# Patient Record
Sex: FEMALE | Race: WHITE | NOT HISPANIC OR LATINO | Employment: UNEMPLOYED | URBAN - METROPOLITAN AREA
[De-identification: names, ages, dates, MRNs, and addresses within clinical notes are randomized per-mention and may not be internally consistent; named-entity substitution may affect disease eponyms.]

---

## 2023-08-30 ENCOUNTER — APPOINTMENT (OUTPATIENT)
Dept: RADIOLOGY | Facility: MEDICAL CENTER | Age: 53
DRG: 085 | End: 2023-08-30
Attending: SURGERY
Payer: MEDICAID

## 2023-08-30 ENCOUNTER — HOSPITAL ENCOUNTER (EMERGENCY)
Facility: MEDICAL CENTER | Age: 53
End: 2023-09-03

## 2023-08-30 ENCOUNTER — APPOINTMENT (OUTPATIENT)
Dept: RADIOLOGY | Facility: MEDICAL CENTER | Age: 53
DRG: 085 | End: 2023-08-30
Attending: STUDENT IN AN ORGANIZED HEALTH CARE EDUCATION/TRAINING PROGRAM
Payer: MEDICAID

## 2023-08-30 ENCOUNTER — HOSPITAL ENCOUNTER (INPATIENT)
Facility: MEDICAL CENTER | Age: 53
LOS: 3 days | DRG: 085 | End: 2023-09-02
Attending: EMERGENCY MEDICINE | Admitting: SURGERY
Payer: MEDICAID

## 2023-08-30 ENCOUNTER — APPOINTMENT (OUTPATIENT)
Dept: RADIOLOGY | Facility: MEDICAL CENTER | Age: 53
DRG: 085 | End: 2023-08-30
Attending: EMERGENCY MEDICINE
Payer: MEDICAID

## 2023-08-30 DIAGNOSIS — J98.8 AIRWAY COMPROMISE: ICD-10-CM

## 2023-08-30 DIAGNOSIS — F10.920 ACUTE ALCOHOLIC INTOXICATION WITHOUT COMPLICATION (HCC): ICD-10-CM

## 2023-08-30 DIAGNOSIS — T14.90XA TRAUMA: ICD-10-CM

## 2023-08-30 DIAGNOSIS — S06.6X9A SUBARACHNOID HEMORRHAGE FOLLOWING INJURY, WITH LOSS OF CONSCIOUSNESS, INITIAL ENCOUNTER (HCC): ICD-10-CM

## 2023-08-30 DIAGNOSIS — R41.82 ALTERED MENTAL STATUS, UNSPECIFIED ALTERED MENTAL STATUS TYPE: ICD-10-CM

## 2023-08-30 PROBLEM — Z86.79 HISTORY OF ATRIAL FIBRILLATION: Status: ACTIVE | Noted: 2023-08-30

## 2023-08-30 PROBLEM — J96.90 RESPIRATORY FAILURE FOLLOWING TRAUMA (HCC): Status: ACTIVE | Noted: 2023-08-30

## 2023-08-30 PROBLEM — F10.929 ACUTE ALCOHOL INTOXICATION (HCC): Status: ACTIVE | Noted: 2023-08-30

## 2023-08-30 PROBLEM — S06.6X1A TRAUMATIC SUBARACHNOID HEMORRHAGE WITH LOSS OF CONSCIOUSNESS OF 30 MINUTES OR LESS (HCC): Status: ACTIVE | Noted: 2023-08-30

## 2023-08-30 PROBLEM — Z53.09 CONTRAINDICATION TO DEEP VEIN THROMBOSIS (DVT) PROPHYLAXIS: Status: ACTIVE | Noted: 2023-08-30

## 2023-08-30 LAB
ABO + RH BLD: NORMAL
ABO GROUP BLD: NORMAL
ALBUMIN SERPL BCP-MCNC: 4.2 G/DL (ref 3.2–4.9)
ALBUMIN/GLOB SERPL: 2.1 G/DL
ALP SERPL-CCNC: 42 U/L (ref 30–99)
ALT SERPL-CCNC: 26 U/L (ref 2–50)
ANION GAP SERPL CALC-SCNC: 16 MMOL/L (ref 7–16)
APTT PPP: 25.5 SEC (ref 24.7–36)
AST SERPL-CCNC: 32 U/L (ref 12–45)
BASE EXCESS BLDA CALC-SCNC: -6 MMOL/L (ref -4–3)
BILIRUB SERPL-MCNC: 0.4 MG/DL (ref 0.1–1.5)
BLD GP AB SCN SERPL QL: NORMAL
BODY TEMPERATURE: 35.6 CENTIGRADE
BUN SERPL-MCNC: 6 MG/DL (ref 8–22)
CALCIUM ALBUM COR SERPL-MCNC: 8.3 MG/DL (ref 8.5–10.5)
CALCIUM SERPL-MCNC: 8.5 MG/DL (ref 8.5–10.5)
CFT BLD TEG: 1.9 MIN (ref 4.6–9.1)
CFT P HPASE BLD TEG: 1.8 MIN (ref 4.3–8.3)
CHLORIDE SERPL-SCNC: 105 MMOL/L (ref 96–112)
CLOT ANGLE BLD TEG: 70.8 DEGREES (ref 63–78)
CLOT LYSIS 30M P MA LENFR BLD TEG: 1.3 % (ref 0–2.6)
CO2 SERPL-SCNC: 20 MMOL/L (ref 20–33)
CREAT SERPL-MCNC: 0.63 MG/DL (ref 0.5–1.4)
CT.EXTRINSIC BLD ROTEM: 1.8 MIN (ref 0.8–2.1)
ERYTHROCYTE [DISTWIDTH] IN BLOOD BY AUTOMATED COUNT: 42 FL (ref 35.9–50)
ETHANOL BLD-MCNC: 233.9 MG/DL
GFR SERPLBLD CREATININE-BSD FMLA CKD-EPI: 107 ML/MIN/1.73 M 2
GLOBULIN SER CALC-MCNC: 2 G/DL (ref 1.9–3.5)
GLUCOSE BLD STRIP.AUTO-MCNC: 92 MG/DL (ref 65–99)
GLUCOSE SERPL-MCNC: 103 MG/DL (ref 65–99)
HCG SERPL QL: NEGATIVE
HCO3 BLDA-SCNC: 18 MMOL/L (ref 17–25)
HCT VFR BLD AUTO: 39.6 % (ref 37–47)
HGB BLD-MCNC: 13.1 G/DL (ref 12–16)
INHALED O2 FLOW RATE: ABNORMAL L/MIN
INR PPP: 0.96 (ref 0.87–1.13)
LACTATE SERPL-SCNC: 4.3 MMOL/L (ref 0.5–2)
MCF BLD TEG: 51.1 MM (ref 52–69)
MCF.PLATELET INHIB BLD ROTEM: 15.4 MM (ref 15–32)
MCH RBC QN AUTO: 31.6 PG (ref 27–33)
MCHC RBC AUTO-ENTMCNC: 33.1 G/DL (ref 32.2–35.5)
MCV RBC AUTO: 95.7 FL (ref 81.4–97.8)
PA AA BLD-ACNC: 2.2 % (ref 0–11)
PA ADP BLD-ACNC: 5.6 % (ref 0–17)
PCO2 BLDA: 32.7 MMHG (ref 26–37)
PCO2 TEMP ADJ BLDA: 30.8 MMHG (ref 26–37)
PH BLDA: 7.37 [PH] (ref 7.4–7.5)
PH TEMP ADJ BLDA: 7.39 [PH] (ref 7.4–7.5)
PLATELET # BLD AUTO: 200 K/UL (ref 164–446)
PMV BLD AUTO: 10.9 FL (ref 9–12.9)
PO2 BLDA: 440.3 MMHG (ref 64–87)
PO2 TEMP ADJ BLDA: 431.9 MMHG (ref 64–87)
POTASSIUM SERPL-SCNC: 3.4 MMOL/L (ref 3.6–5.5)
PROT SERPL-MCNC: 6.2 G/DL (ref 6–8.2)
PROTHROMBIN TIME: 12.9 SEC (ref 12–14.6)
RBC # BLD AUTO: 4.14 M/UL (ref 4.2–5.4)
RH BLD: NORMAL
SAO2 % BLDA: 99 % (ref 93–99)
SODIUM SERPL-SCNC: 141 MMOL/L (ref 135–145)
TEG ALGORITHM TGALG: ABNORMAL
WBC # BLD AUTO: 2.8 K/UL (ref 4.8–10.8)

## 2023-08-30 PROCEDURE — 700105 HCHG RX REV CODE 258: Performed by: SURGERY

## 2023-08-30 PROCEDURE — 99291 CRITICAL CARE FIRST HOUR: CPT | Performed by: SURGERY

## 2023-08-30 PROCEDURE — 99291 CRITICAL CARE FIRST HOUR: CPT

## 2023-08-30 PROCEDURE — 71045 X-RAY EXAM CHEST 1 VIEW: CPT

## 2023-08-30 PROCEDURE — 82077 ASSAY SPEC XCP UR&BREATH IA: CPT

## 2023-08-30 PROCEDURE — 94002 VENT MGMT INPAT INIT DAY: CPT

## 2023-08-30 PROCEDURE — 36415 COLL VENOUS BLD VENIPUNCTURE: CPT

## 2023-08-30 PROCEDURE — 83605 ASSAY OF LACTIC ACID: CPT

## 2023-08-30 PROCEDURE — 770022 HCHG ROOM/CARE - ICU (200)

## 2023-08-30 PROCEDURE — 700101 HCHG RX REV CODE 250: Performed by: SURGERY

## 2023-08-30 PROCEDURE — 82962 GLUCOSE BLOOD TEST: CPT

## 2023-08-30 PROCEDURE — 31500 INSERT EMERGENCY AIRWAY: CPT

## 2023-08-30 PROCEDURE — 80053 COMPREHEN METABOLIC PANEL: CPT

## 2023-08-30 PROCEDURE — 71260 CT THORAX DX C+: CPT

## 2023-08-30 PROCEDURE — 85576 BLOOD PLATELET AGGREGATION: CPT

## 2023-08-30 PROCEDURE — 86901 BLOOD TYPING SEROLOGIC RH(D): CPT

## 2023-08-30 PROCEDURE — 86900 BLOOD TYPING SEROLOGIC ABO: CPT

## 2023-08-30 PROCEDURE — 93005 ELECTROCARDIOGRAM TRACING: CPT | Performed by: SURGERY

## 2023-08-30 PROCEDURE — 85730 THROMBOPLASTIN TIME PARTIAL: CPT

## 2023-08-30 PROCEDURE — 700111 HCHG RX REV CODE 636 W/ 250 OVERRIDE (IP): Mod: JZ | Performed by: SURGERY

## 2023-08-30 PROCEDURE — 700111 HCHG RX REV CODE 636 W/ 250 OVERRIDE (IP): Performed by: INTERNAL MEDICINE

## 2023-08-30 PROCEDURE — G0390 TRAUMA RESPONS W/HOSP CRITI: HCPCS

## 2023-08-30 PROCEDURE — 85610 PROTHROMBIN TIME: CPT

## 2023-08-30 PROCEDURE — 82803 BLOOD GASES ANY COMBINATION: CPT

## 2023-08-30 PROCEDURE — 84703 CHORIONIC GONADOTROPIN ASSAY: CPT

## 2023-08-30 PROCEDURE — 304538 HCHG NG TUBE

## 2023-08-30 PROCEDURE — 85347 COAGULATION TIME ACTIVATED: CPT

## 2023-08-30 PROCEDURE — 72170 X-RAY EXAM OF PELVIS: CPT

## 2023-08-30 PROCEDURE — 85027 COMPLETE CBC AUTOMATED: CPT

## 2023-08-30 PROCEDURE — 80307 DRUG TEST PRSMV CHEM ANLYZR: CPT

## 2023-08-30 PROCEDURE — 700117 HCHG RX CONTRAST REV CODE 255: Performed by: EMERGENCY MEDICINE

## 2023-08-30 PROCEDURE — 72131 CT LUMBAR SPINE W/O DYE: CPT

## 2023-08-30 PROCEDURE — 94799 UNLISTED PULMONARY SVC/PX: CPT

## 2023-08-30 PROCEDURE — 72125 CT NECK SPINE W/O DYE: CPT

## 2023-08-30 PROCEDURE — 72128 CT CHEST SPINE W/O DYE: CPT

## 2023-08-30 PROCEDURE — 85384 FIBRINOGEN ACTIVITY: CPT

## 2023-08-30 PROCEDURE — 70450 CT HEAD/BRAIN W/O DYE: CPT

## 2023-08-30 PROCEDURE — 86850 RBC ANTIBODY SCREEN: CPT

## 2023-08-30 RX ORDER — SODIUM CHLORIDE 9 MG/ML
INJECTION, SOLUTION INTRAVENOUS CONTINUOUS
Status: DISCONTINUED | OUTPATIENT
Start: 2023-08-30 | End: 2023-09-02 | Stop reason: HOSPADM

## 2023-08-30 RX ORDER — ROCURONIUM BROMIDE 10 MG/ML
INJECTION, SOLUTION INTRAVENOUS
Status: COMPLETED | OUTPATIENT
Start: 2023-08-30 | End: 2023-08-30

## 2023-08-30 RX ORDER — POLYETHYLENE GLYCOL 3350 17 G/17G
1 POWDER, FOR SOLUTION ORAL 2 TIMES DAILY
Status: DISCONTINUED | OUTPATIENT
Start: 2023-08-30 | End: 2023-09-02 | Stop reason: HOSPADM

## 2023-08-30 RX ORDER — FAMOTIDINE 20 MG/1
20 TABLET, FILM COATED ORAL 2 TIMES DAILY
Status: DISCONTINUED | OUTPATIENT
Start: 2023-08-30 | End: 2023-08-31

## 2023-08-30 RX ORDER — AMOXICILLIN 250 MG
1 CAPSULE ORAL
Status: DISCONTINUED | OUTPATIENT
Start: 2023-08-30 | End: 2023-09-02 | Stop reason: HOSPADM

## 2023-08-30 RX ORDER — SODIUM CHLORIDE, SODIUM LACTATE, POTASSIUM CHLORIDE, AND CALCIUM CHLORIDE .6; .31; .03; .02 G/100ML; G/100ML; G/100ML; G/100ML
1000 INJECTION, SOLUTION INTRAVENOUS ONCE
Status: COMPLETED | OUTPATIENT
Start: 2023-08-30 | End: 2023-08-31

## 2023-08-30 RX ORDER — DOCUSATE SODIUM 100 MG/1
100 CAPSULE, LIQUID FILLED ORAL 2 TIMES DAILY
Status: DISCONTINUED | OUTPATIENT
Start: 2023-08-30 | End: 2023-09-02 | Stop reason: HOSPADM

## 2023-08-30 RX ORDER — BISACODYL 10 MG
10 SUPPOSITORY, RECTAL RECTAL
Status: DISCONTINUED | OUTPATIENT
Start: 2023-08-30 | End: 2023-09-02 | Stop reason: HOSPADM

## 2023-08-30 RX ORDER — ONDANSETRON 4 MG/1
4 TABLET, ORALLY DISINTEGRATING ORAL EVERY 4 HOURS PRN
Status: DISCONTINUED | OUTPATIENT
Start: 2023-08-30 | End: 2023-09-02 | Stop reason: HOSPADM

## 2023-08-30 RX ORDER — MIDAZOLAM HYDROCHLORIDE 1 MG/ML
INJECTION INTRAMUSCULAR; INTRAVENOUS
Status: COMPLETED | OUTPATIENT
Start: 2023-08-30 | End: 2023-08-30

## 2023-08-30 RX ORDER — LEVETIRACETAM 500 MG/5ML
500 INJECTION, SOLUTION, CONCENTRATE INTRAVENOUS EVERY 12 HOURS
Status: DISCONTINUED | OUTPATIENT
Start: 2023-08-30 | End: 2023-09-02 | Stop reason: HOSPADM

## 2023-08-30 RX ORDER — AMOXICILLIN 250 MG
1 CAPSULE ORAL NIGHTLY
Status: DISCONTINUED | OUTPATIENT
Start: 2023-08-30 | End: 2023-09-02 | Stop reason: HOSPADM

## 2023-08-30 RX ORDER — MIDAZOLAM HYDROCHLORIDE 1 MG/ML
4 INJECTION INTRAMUSCULAR; INTRAVENOUS ONCE
Status: COMPLETED | OUTPATIENT
Start: 2023-08-30 | End: 2023-08-30

## 2023-08-30 RX ORDER — ENEMA 19; 7 G/133ML; G/133ML
1 ENEMA RECTAL
Status: DISCONTINUED | OUTPATIENT
Start: 2023-08-30 | End: 2023-09-02 | Stop reason: HOSPADM

## 2023-08-30 RX ORDER — ONDANSETRON 2 MG/ML
4 INJECTION INTRAMUSCULAR; INTRAVENOUS EVERY 4 HOURS PRN
Status: DISCONTINUED | OUTPATIENT
Start: 2023-08-30 | End: 2023-09-02 | Stop reason: HOSPADM

## 2023-08-30 RX ORDER — OXYCODONE HYDROCHLORIDE 5 MG/1
5 TABLET ORAL
Status: DISCONTINUED | OUTPATIENT
Start: 2023-08-30 | End: 2023-09-02 | Stop reason: HOSPADM

## 2023-08-30 RX ADMIN — LEVETIRACETAM 500 MG: 100 INJECTION, SOLUTION, CONCENTRATE INTRAVENOUS at 23:36

## 2023-08-30 RX ADMIN — ROCURONIUM BROMIDE 50 MG: 10 INJECTION, SOLUTION INTRAVENOUS at 22:18

## 2023-08-30 RX ADMIN — IOHEXOL 80 ML: 350 INJECTION, SOLUTION INTRAVENOUS at 22:25

## 2023-08-30 RX ADMIN — SODIUM CHLORIDE, POTASSIUM CHLORIDE, SODIUM LACTATE AND CALCIUM CHLORIDE 1000 ML: 600; 310; 30; 20 INJECTION, SOLUTION INTRAVENOUS at 23:16

## 2023-08-30 RX ADMIN — FAMOTIDINE 20 MG: 10 INJECTION INTRAVENOUS at 22:52

## 2023-08-30 RX ADMIN — SODIUM CHLORIDE: 9 INJECTION, SOLUTION INTRAVENOUS at 22:51

## 2023-08-30 RX ADMIN — PROPOFOL 5 MCG/KG/MIN: 10 INJECTION, EMULSION INTRAVENOUS at 23:51

## 2023-08-30 RX ADMIN — MIDAZOLAM 4 MG: 1 INJECTION, SOLUTION INTRAMUSCULAR; INTRAVENOUS at 22:16

## 2023-08-30 ASSESSMENT — PATIENT HEALTH QUESTIONNAIRE - PHQ9
SUM OF ALL RESPONSES TO PHQ9 QUESTIONS 1 AND 2: 0
1. LITTLE INTEREST OR PLEASURE IN DOING THINGS: NOT AT ALL
2. FEELING DOWN, DEPRESSED, IRRITABLE, OR HOPELESS: NOT AT ALL

## 2023-08-30 ASSESSMENT — PAIN DESCRIPTION - PAIN TYPE: TYPE: ACUTE PAIN

## 2023-08-31 ENCOUNTER — APPOINTMENT (OUTPATIENT)
Dept: RADIOLOGY | Facility: MEDICAL CENTER | Age: 53
DRG: 085 | End: 2023-08-31
Attending: SURGERY
Payer: MEDICAID

## 2023-08-31 ENCOUNTER — APPOINTMENT (OUTPATIENT)
Dept: RADIOLOGY | Facility: MEDICAL CENTER | Age: 53
DRG: 085 | End: 2023-08-31
Attending: NURSE PRACTITIONER
Payer: MEDICAID

## 2023-08-31 ENCOUNTER — HOSPITAL ENCOUNTER (OUTPATIENT)
Dept: RADIOLOGY | Facility: MEDICAL CENTER | Age: 53
End: 2023-08-31
Attending: SURGERY
Payer: MEDICAID

## 2023-08-31 PROBLEM — Z02.9 DISCHARGE PLANNING ISSUES: Status: ACTIVE | Noted: 2023-08-31

## 2023-08-31 PROBLEM — R20.2 PARESTHESIA: Status: ACTIVE | Noted: 2023-08-31

## 2023-08-31 LAB
ALBUMIN SERPL BCP-MCNC: 4 G/DL (ref 3.2–4.9)
ALBUMIN/GLOB SERPL: 2 G/DL
ALP SERPL-CCNC: 41 U/L (ref 30–99)
ALT SERPL-CCNC: 28 U/L (ref 2–50)
AMPHET UR QL SCN: NEGATIVE
ANION GAP SERPL CALC-SCNC: 15 MMOL/L (ref 7–16)
APPEARANCE UR: CLEAR
AST SERPL-CCNC: 38 U/L (ref 12–45)
BARBITURATES UR QL SCN: NEGATIVE
BENZODIAZ UR QL SCN: POSITIVE
BILIRUB SERPL-MCNC: 0.2 MG/DL (ref 0.1–1.5)
BILIRUB UR QL STRIP.AUTO: NEGATIVE
BUN SERPL-MCNC: 4 MG/DL (ref 8–22)
BZE UR QL SCN: NEGATIVE
CALCIUM ALBUM COR SERPL-MCNC: 8.6 MG/DL (ref 8.5–10.5)
CALCIUM SERPL-MCNC: 8.6 MG/DL (ref 8.5–10.5)
CANNABINOIDS UR QL SCN: NEGATIVE
CHLORIDE SERPL-SCNC: 107 MMOL/L (ref 96–112)
CO2 SERPL-SCNC: 20 MMOL/L (ref 20–33)
COLOR UR: YELLOW
CREAT SERPL-MCNC: 0.67 MG/DL (ref 0.5–1.4)
EKG IMPRESSION: NORMAL
FENTANYL UR QL: POSITIVE
GFR SERPLBLD CREATININE-BSD FMLA CKD-EPI: 105 ML/MIN/1.73 M 2
GLOBULIN SER CALC-MCNC: 2 G/DL (ref 1.9–3.5)
GLUCOSE SERPL-MCNC: 88 MG/DL (ref 65–99)
GLUCOSE UR STRIP.AUTO-MCNC: NEGATIVE MG/DL
KETONES UR STRIP.AUTO-MCNC: 40 MG/DL
LACTATE SERPL-SCNC: 3.9 MMOL/L (ref 0.5–2)
LACTATE SERPL-SCNC: 4.4 MMOL/L (ref 0.5–2)
LEUKOCYTE ESTERASE UR QL STRIP.AUTO: NEGATIVE
MAGNESIUM SERPL-MCNC: 1.8 MG/DL (ref 1.5–2.5)
METHADONE UR QL SCN: NEGATIVE
MICRO URNS: ABNORMAL
NITRITE UR QL STRIP.AUTO: NEGATIVE
OPIATES UR QL SCN: NEGATIVE
OXYCODONE UR QL SCN: NEGATIVE
PCP UR QL SCN: NEGATIVE
PH UR STRIP.AUTO: 6 [PH] (ref 5–8)
PHOSPHATE SERPL-MCNC: 2.4 MG/DL (ref 2.5–4.5)
POTASSIUM SERPL-SCNC: 4 MMOL/L (ref 3.6–5.5)
PROPOXYPH UR QL SCN: NEGATIVE
PROT SERPL-MCNC: 6 G/DL (ref 6–8.2)
PROT UR QL STRIP: NEGATIVE MG/DL
RBC UR QL AUTO: NEGATIVE
SODIUM SERPL-SCNC: 142 MMOL/L (ref 135–145)
SP GR UR STRIP.AUTO: 1.01
UROBILINOGEN UR STRIP.AUTO-MCNC: 0.2 MG/DL

## 2023-08-31 PROCEDURE — 700102 HCHG RX REV CODE 250 W/ 637 OVERRIDE(OP): Performed by: SURGERY

## 2023-08-31 PROCEDURE — 93010 ELECTROCARDIOGRAM REPORT: CPT | Performed by: INTERNAL MEDICINE

## 2023-08-31 PROCEDURE — 99291 CRITICAL CARE FIRST HOUR: CPT | Performed by: SURGERY

## 2023-08-31 PROCEDURE — 83605 ASSAY OF LACTIC ACID: CPT

## 2023-08-31 PROCEDURE — 94799 UNLISTED PULMONARY SVC/PX: CPT

## 2023-08-31 PROCEDURE — 700111 HCHG RX REV CODE 636 W/ 250 OVERRIDE (IP): Mod: JZ | Performed by: SURGERY

## 2023-08-31 PROCEDURE — 700101 HCHG RX REV CODE 250: Performed by: SURGERY

## 2023-08-31 PROCEDURE — 700111 HCHG RX REV CODE 636 W/ 250 OVERRIDE (IP): Performed by: NURSE PRACTITIONER

## 2023-08-31 PROCEDURE — A9270 NON-COVERED ITEM OR SERVICE: HCPCS | Performed by: SURGERY

## 2023-08-31 PROCEDURE — 84100 ASSAY OF PHOSPHORUS: CPT

## 2023-08-31 PROCEDURE — 83735 ASSAY OF MAGNESIUM: CPT

## 2023-08-31 PROCEDURE — 700105 HCHG RX REV CODE 258: Performed by: SURGERY

## 2023-08-31 PROCEDURE — 81003 URINALYSIS AUTO W/O SCOPE: CPT

## 2023-08-31 PROCEDURE — 770006 HCHG ROOM/CARE - MED/SURG/GYN SEMI*

## 2023-08-31 PROCEDURE — 94003 VENT MGMT INPAT SUBQ DAY: CPT

## 2023-08-31 PROCEDURE — 72141 MRI NECK SPINE W/O DYE: CPT

## 2023-08-31 PROCEDURE — 700102 HCHG RX REV CODE 250 W/ 637 OVERRIDE(OP): Performed by: NURSE PRACTITIONER

## 2023-08-31 PROCEDURE — 71045 X-RAY EXAM CHEST 1 VIEW: CPT

## 2023-08-31 PROCEDURE — 80053 COMPREHEN METABOLIC PANEL: CPT

## 2023-08-31 PROCEDURE — 70450 CT HEAD/BRAIN W/O DYE: CPT

## 2023-08-31 PROCEDURE — 700105 HCHG RX REV CODE 258: Performed by: NURSE PRACTITIONER

## 2023-08-31 PROCEDURE — A9270 NON-COVERED ITEM OR SERVICE: HCPCS | Performed by: NURSE PRACTITIONER

## 2023-08-31 RX ORDER — MAGNESIUM SULFATE HEPTAHYDRATE 40 MG/ML
2 INJECTION, SOLUTION INTRAVENOUS ONCE
Status: COMPLETED | OUTPATIENT
Start: 2023-08-31 | End: 2023-08-31

## 2023-08-31 RX ORDER — ACETAMINOPHEN 500 MG
1000 TABLET ORAL EVERY 6 HOURS PRN
Status: DISCONTINUED | OUTPATIENT
Start: 2023-08-31 | End: 2023-09-02 | Stop reason: HOSPADM

## 2023-08-31 RX ORDER — SODIUM CHLORIDE, SODIUM LACTATE, POTASSIUM CHLORIDE, AND CALCIUM CHLORIDE .6; .31; .03; .02 G/100ML; G/100ML; G/100ML; G/100ML
1000 INJECTION, SOLUTION INTRAVENOUS ONCE
Status: COMPLETED | OUTPATIENT
Start: 2023-08-31 | End: 2023-08-31

## 2023-08-31 RX ORDER — ENOXAPARIN SODIUM 100 MG/ML
30 INJECTION SUBCUTANEOUS EVERY 12 HOURS
Status: DISCONTINUED | OUTPATIENT
Start: 2023-08-31 | End: 2023-09-02 | Stop reason: HOSPADM

## 2023-08-31 RX ORDER — HYDROMORPHONE HYDROCHLORIDE 1 MG/ML
.5-1 INJECTION, SOLUTION INTRAMUSCULAR; INTRAVENOUS; SUBCUTANEOUS
Status: DISCONTINUED | OUTPATIENT
Start: 2023-08-31 | End: 2023-09-02 | Stop reason: HOSPADM

## 2023-08-31 RX ADMIN — SODIUM CHLORIDE: 9 INJECTION, SOLUTION INTRAVENOUS at 09:05

## 2023-08-31 RX ADMIN — ENOXAPARIN SODIUM 30 MG: 100 INJECTION SUBCUTANEOUS at 10:37

## 2023-08-31 RX ADMIN — FAMOTIDINE 20 MG: 10 INJECTION, SOLUTION INTRAVENOUS at 05:23

## 2023-08-31 RX ADMIN — MAGNESIUM SULFATE HEPTAHYDRATE 2 G: 2 INJECTION, SOLUTION INTRAVENOUS at 09:06

## 2023-08-31 RX ADMIN — POLYETHYLENE GLYCOL 3350 1 PACKET: 17 POWDER, FOR SOLUTION ORAL at 17:05

## 2023-08-31 RX ADMIN — SENNOSIDES AND DOCUSATE SODIUM 1 TABLET: 50; 8.6 TABLET ORAL at 21:11

## 2023-08-31 RX ADMIN — LEVETIRACETAM 500 MG: 100 INJECTION, SOLUTION, CONCENTRATE INTRAVENOUS at 17:05

## 2023-08-31 RX ADMIN — FENTANYL CITRATE 50 MCG: 50 INJECTION, SOLUTION INTRAMUSCULAR; INTRAVENOUS at 01:14

## 2023-08-31 RX ADMIN — DOCUSATE SODIUM 100 MG: 100 CAPSULE, LIQUID FILLED ORAL at 17:05

## 2023-08-31 RX ADMIN — POTASSIUM PHOSPHATE, MONOBASIC AND POTASSIUM PHOSPHATE, DIBASIC 15 MMOL: 224; 236 INJECTION, SOLUTION, CONCENTRATE INTRAVENOUS at 09:09

## 2023-08-31 RX ADMIN — ACETAMINOPHEN 1000 MG: 500 TABLET, FILM COATED ORAL at 16:07

## 2023-08-31 RX ADMIN — ENOXAPARIN SODIUM 30 MG: 100 INJECTION SUBCUTANEOUS at 17:05

## 2023-08-31 RX ADMIN — SODIUM CHLORIDE: 9 INJECTION, SOLUTION INTRAVENOUS at 21:26

## 2023-08-31 RX ADMIN — LEVETIRACETAM 500 MG: 100 INJECTION, SOLUTION, CONCENTRATE INTRAVENOUS at 05:23

## 2023-08-31 RX ADMIN — SODIUM CHLORIDE, POTASSIUM CHLORIDE, SODIUM LACTATE AND CALCIUM CHLORIDE 1000 ML: 600; 310; 30; 20 INJECTION, SOLUTION INTRAVENOUS at 02:42

## 2023-08-31 RX ADMIN — ONDANSETRON 4 MG: 2 INJECTION INTRAMUSCULAR; INTRAVENOUS at 13:08

## 2023-08-31 ASSESSMENT — LIFESTYLE VARIABLES
ALCOHOL_USE: YES
EVER FELT BAD OR GUILTY ABOUT YOUR DRINKING: NO
TOTAL SCORE: 0
ON A TYPICAL DAY WHEN YOU DRINK ALCOHOL HOW MANY DRINKS DO YOU HAVE: 1
DOES PATIENT WANT TO STOP DRINKING: NO
CONSUMPTION TOTAL: POSITIVE
TOTAL SCORE: 0
AVERAGE NUMBER OF DAYS PER WEEK YOU HAVE A DRINK CONTAINING ALCOHOL: 0.25
HOW MANY TIMES IN THE PAST YEAR HAVE YOU HAD 5 OR MORE DRINKS IN A DAY: 3
EVER HAD A DRINK FIRST THING IN THE MORNING TO STEADY YOUR NERVES TO GET RID OF A HANGOVER: NO
HAVE PEOPLE ANNOYED YOU BY CRITICIZING YOUR DRINKING: NO
HAVE YOU EVER FELT YOU SHOULD CUT DOWN ON YOUR DRINKING: NO
TOTAL SCORE: 0

## 2023-08-31 ASSESSMENT — COGNITIVE AND FUNCTIONAL STATUS - GENERAL
DRESSING REGULAR UPPER BODY CLOTHING: A LITTLE
DAILY ACTIVITIY SCORE: 18
MOVING TO AND FROM BED TO CHAIR: A LITTLE
MOBILITY SCORE: 19
SUGGESTED CMS G CODE MODIFIER MOBILITY: CK
CLIMB 3 TO 5 STEPS WITH RAILING: A LITTLE
PERSONAL GROOMING: A LITTLE
HELP NEEDED FOR BATHING: A LITTLE
STANDING UP FROM CHAIR USING ARMS: A LITTLE
WALKING IN HOSPITAL ROOM: A LITTLE
SUGGESTED CMS G CODE MODIFIER DAILY ACTIVITY: CK
TOILETING: A LITTLE
DRESSING REGULAR LOWER BODY CLOTHING: A LITTLE
MOVING FROM LYING ON BACK TO SITTING ON SIDE OF FLAT BED: A LITTLE
EATING MEALS: A LITTLE

## 2023-08-31 ASSESSMENT — FIBROSIS 4 INDEX: FIB4 SCORE: 1.6

## 2023-08-31 ASSESSMENT — PAIN DESCRIPTION - PAIN TYPE
TYPE: ACUTE PAIN
TYPE: ACUTE PAIN

## 2023-08-31 NOTE — PROGRESS NOTES
Patient was extubated after tertiary survey was initially completed.  She is complaining of subjective numbness to her hands bilaterally and noted weakness on exam.  A MRI CT was ordered and neurosurgery was updated.    MRI completed. Reviewed. No longer having neck pain. Ok to remove C-collar and transfer to [facility].

## 2023-08-31 NOTE — ASSESSMENT & PLAN NOTE
VTE prophylaxis initially contraindicated secondary to elevated bleeding risk.  9/1 Trauma surveillance venous duplex ultrasonography ordered.   8/31 Start lovenox.

## 2023-08-31 NOTE — PROGRESS NOTES
Trauma / Surgical Daily Progress Note    Date of Service  8/31/2023    Chief Complaint  53 y.o. female admitted 8/30/2023 with traumatic brain injury    Interval Events  Overnight patient was admitted intubated after falling off of an art car.  She did have a small traumatic brain injury.  We were able to wake her up and extubate her today.  She continues to have pain in her cervical spine after extubation.  She also reports some tingling in her upper extremities.  Her bilateral  strength is slightly reduced.  We are sending her for an MRI of her cervical spine.  She is getting Lovenox started today.  Replacing her magnesium and phosphorus.  She will start working with therapies after her MRI is complete.    Review of Systems  Review of Systems   Unable to perform ROS: Intubated        Vital Signs for last 24 hours  Temp:  [35.6 °C (96 °F)] 35.6 °C (96 °F)  Pulse:  [] 94  Resp:  [8-36] 22  BP: ()/(52-97) 101/55  SpO2:  [97 %-100 %] 97 %    Hemodynamic parameters for last 24 hours       Respiratory Data     Respiration: (!) 22, Pulse Oximetry: 97 %     Work Of Breathing / Effort: Within Normal Limits  RUL Breath Sounds: Clear, RML Breath Sounds: Clear, RLL Breath Sounds: Clear, BEN Breath Sounds: Clear, LLL Breath Sounds: Clear    Physical Exam  Physical Exam  Vitals and nursing note reviewed.   Constitutional:       General: She is sleeping.      Appearance: Normal appearance.      Interventions: She is sedated, chemically paralyzed and restrained. Cervical collar in place.   HENT:      Head: Normocephalic.      Mouth/Throat:      Mouth: Mucous membranes are moist.   Neck:      Comments: collar  Cardiovascular:      Rate and Rhythm: Normal rate.      Pulses: Normal pulses.   Pulmonary:      Effort: No respiratory distress.      Breath sounds: No stridor. No rhonchi.   Abdominal:      General: Abdomen is flat.      Palpations: Abdomen is soft.   Genitourinary:     Comments: Hernandez to gravity drain    Skin:     General: Skin is warm and dry.      Capillary Refill: Capillary refill takes less than 2 seconds.      Findings: Abrasion present.     Laboratory  Recent Results (from the past 24 hour(s))   PLATELET MAPPING WITH BASIC TEG    Collection Time: 08/30/23 10:14 PM   Result Value Ref Range    Reaction Time Initial-R 1.9 (L) 4.6 - 9.1 min    React Time Initial Hep 1.8 (L) 4.3 - 8.3 min    Clot Kinetics-K 1.8 0.8 - 2.1 min    Clot Angle-Angle 70.8 63.0 - 78.0 degrees    Maximum Clot Strength-MA 51.1 (L) 52.0 - 69.0 mm    TEG Functional Fibrinogen(MA) 15.4 15.0 - 32.0 mm    Lysis 30 minutes-LY30 1.3 0.0 - 2.6 %    % Inhibition ADP 5.6 0.0 - 17.0 %    % Inhibition AA 2.2 0.0 - 11.0 %    TEG Algorithm Link Algorithm    LACTIC ACID    Collection Time: 08/30/23 10:14 PM   Result Value Ref Range    Lactic Acid 4.3 (HH) 0.5 - 2.0 mmol/L   HCG QUAL SERUM    Collection Time: 08/30/23 10:14 PM   Result Value Ref Range    Beta-Hcg Qualitative Serum Negative Negative   DIAGNOSTIC ALCOHOL    Collection Time: 08/30/23 10:14 PM   Result Value Ref Range    Diagnostic Alcohol 233.9 (H) <10.1 mg/dL   Comp Metabolic Panel    Collection Time: 08/30/23 10:14 PM   Result Value Ref Range    Sodium 141 135 - 145 mmol/L    Potassium 3.4 (L) 3.6 - 5.5 mmol/L    Chloride 105 96 - 112 mmol/L    Co2 20 20 - 33 mmol/L    Anion Gap 16.0 7.0 - 16.0    Glucose 103 (H) 65 - 99 mg/dL    Bun 6 (L) 8 - 22 mg/dL    Creatinine 0.63 0.50 - 1.40 mg/dL    Calcium 8.5 8.5 - 10.5 mg/dL    Correct Calcium 8.3 (L) 8.5 - 10.5 mg/dL    AST(SGOT) 32 12 - 45 U/L    ALT(SGPT) 26 2 - 50 U/L    Alkaline Phosphatase 42 30 - 99 U/L    Total Bilirubin 0.4 0.1 - 1.5 mg/dL    Albumin 4.2 3.2 - 4.9 g/dL    Total Protein 6.2 6.0 - 8.2 g/dL    Globulin 2.0 1.9 - 3.5 g/dL    A-G Ratio 2.1 g/dL   ABO Rh Confirm    Collection Time: 08/30/23 10:14 PM   Result Value Ref Range    ABO Rh Confirm A POS    ESTIMATED GFR    Collection Time: 08/30/23 10:14 PM   Result Value Ref  Range    GFR (CKD-EPI) 107 >60 mL/min/1.73 m 2   ARTERIAL BLOOD GAS    Collection Time: 08/30/23 10:16 PM   Result Value Ref Range    Ph 7.37 (L) 7.40 - 7.50    Pco2 32.7 26.0 - 37.0 mmHg    Po2 440.3 (H) 64.0 - 87.0 mmHg    O2 Saturation 99.0 93.0 - 99.0 %    Hco3 18 17 - 25 mmol/L    Base Excess -6 (L) -4 - 3 mmol/L    Body Temp 35.6 Centigrade    O2 Therapy 15l     Ph -TC 7.39 (L) 7.40 - 7.50    Pco2 -TC 30.8 26.0 - 37.0 mmHg    Po2 -.9 (H) 64.0 - 87.0 mmHg   COD - Adult (Type and Screen)    Collection Time: 08/30/23 10:16 PM   Result Value Ref Range    ABO Grouping Only A     Rh Grouping Only POS     Antibody Screen-Cod NEG    URINE DRUG SCREEN    Collection Time: 08/30/23 10:54 PM   Result Value Ref Range    Amphetamines Urine Negative Negative    Barbiturates Negative Negative    Benzodiazepines Positive (A) Negative    Cocaine Metabolite Negative Negative    Fentanyl, Urine Positive (A) Negative    Methadone Negative Negative    Opiates Negative Negative    Oxycodone Negative Negative    Phencyclidine -Pcp Negative Negative    Propoxyphene Negative Negative    Cannabinoid Metab Negative Negative   CBC WITHOUT DIFFERENTIAL    Collection Time: 08/30/23 11:00 PM   Result Value Ref Range    WBC 2.8 (L) 4.8 - 10.8 K/uL    RBC 4.14 (L) 4.20 - 5.40 M/uL    Hemoglobin 13.1 12.0 - 16.0 g/dL    Hematocrit 39.6 37.0 - 47.0 %    MCV 95.7 81.4 - 97.8 fL    MCH 31.6 27.0 - 33.0 pg    MCHC 33.1 32.2 - 35.5 g/dL    RDW 42.0 35.9 - 50.0 fL    Platelet Count 200 164 - 446 K/uL    MPV 10.9 9.0 - 12.9 fL   Prothrombin Time    Collection Time: 08/30/23 11:00 PM   Result Value Ref Range    PT 12.9 12.0 - 14.6 sec    INR 0.96 0.87 - 1.13   APTT    Collection Time: 08/30/23 11:00 PM   Result Value Ref Range    APTT 25.5 24.7 - 36.0 sec   POCT glucose device results    Collection Time: 08/30/23 11:05 PM   Result Value Ref Range    POC Glucose, Blood 92 65 - 99 mg/dL   EKG    Collection Time: 08/30/23 11:51 PM   Result Value Ref  Range    Report       [facility] Cardiology    Test Date:  2023  Pt Name:    [facility] SEVENTY-TWO           Department: ER  MRN:        [id number]                      Room:       T929  Gender:     Female                       Technician: MYLES  :        1970                   Requested By:JOE HARVEY  Order #:    [id number]                    Reading MD:    Measurements  Intervals                                Axis  Rate:       73                           P:          -51  VA:         126                          QRS:        81  QRSD:       101                          T:          58  QT:         464  QTc:        512    Interpretive Statements  Sinus or ectopic atrial rhythm  Prolonged QT interval  No previous ECG available for comparison     LACTIC ACID    Collection Time: 23  1:00 AM   Result Value Ref Range    Lactic Acid 3.9 (H) 0.5 - 2.0 mmol/L   Comp Metabolic Panel (CMP): Tomorrow AM    Collection Time: 23  5:19 AM   Result Value Ref Range    Sodium 142 135 - 145 mmol/L    Potassium 4.0 3.6 - 5.5 mmol/L    Chloride 107 96 - 112 mmol/L    Co2 20 20 - 33 mmol/L    Anion Gap 15.0 7.0 - 16.0    Glucose 88 65 - 99 mg/dL    Bun 4 (L) 8 - 22 mg/dL    Creatinine 0.67 0.50 - 1.40 mg/dL    Calcium 8.6 8.5 - 10.5 mg/dL    Correct Calcium 8.6 8.5 - 10.5 mg/dL    AST(SGOT) 38 12 - 45 U/L    ALT(SGPT) 28 2 - 50 U/L    Alkaline Phosphatase 41 30 - 99 U/L    Total Bilirubin 0.2 0.1 - 1.5 mg/dL    Albumin 4.0 3.2 - 4.9 g/dL    Total Protein 6.0 6.0 - 8.2 g/dL    Globulin 2.0 1.9 - 3.5 g/dL    A-G Ratio 2.0 g/dL   Magnesium: Every Monday and Thursday AM    Collection Time: 23  5:19 AM   Result Value Ref Range    Magnesium 1.8 1.5 - 2.5 mg/dL   Phosphorus: Every Monday and Thursday AM    Collection Time: 23  5:19 AM   Result Value Ref Range    Phosphorus 2.4 (L) 2.5 - 4.5 mg/dL   LACTIC ACID    Collection Time: 23  5:19 AM   Result Value Ref Range    Lactic Acid 4.4 (HH) 0.5 - 2.0  mmol/L   ESTIMATED GFR    Collection Time: 08/31/23  5:19 AM   Result Value Ref Range    GFR (CKD-EPI) 105 >60 mL/min/1.73 m 2   URINALYSIS    Collection Time: 08/31/23 12:15 PM    Specimen: Urine, Hernandez Cath   Result Value Ref Range    Color Yellow     Character Clear     Specific Gravity 1.015 <1.035    Ph 6.0 5.0 - 8.0    Glucose Negative Negative mg/dL    Ketones 40 (A) Negative mg/dL    Protein Negative Negative mg/dL    Bilirubin Negative Negative    Urobilinogen, Urine 0.2 Negative    Nitrite Negative Negative    Leukocyte Esterase Negative Negative    Occult Blood Negative Negative    Micro Urine Req see below        Fluids    Intake/Output Summary (Last 24 hours) at 8/31/2023 1417  Last data filed at 8/31/2023 1409  Gross per 24 hour   Intake 4509.24 ml   Output 3800 ml   Net 709.24 ml       Core Measures & Quality Metrics  Radiology images reviewed, EKG reviewed, Labs reviewed and Medications reviewed  Hernandez catheter: Critically Ill - Requiring Accurate Measurement of Urinary Output      DVT Prophylaxis: Contraindicated - High bleeding risk  DVT prophylaxis - mechanical: SCDs  Ulcer prophylaxis: Yes        RAP Score Total: 6     Blood Alcohol>0.08: yes       Reason for no ETOH Intervention: Intubated  Intervention: Not Warranted.       Assessment/Plan  * Trauma- (present on admission)  Assessment & Plan  Fall from a structure 8 ft high at [facility].  Positive loss of consciousness. Vomiting at the scene. Intubated for airway protection.  Trauma Red Activation.  Hernán Esparza MD. Trauma Surgery.    Respiratory failure following trauma (HCC)- (present on admission)  Assessment & Plan  Intubated at [facility] for airway protection.  Continue full mechanical ventilatory support.   Ventilator bundle and Trauma weaning protocol.  8/31 extubated.    Traumatic subarachnoid hemorrhage with loss of consciousness of 30 minutes or less (HCC)- (present on admission)  Assessment & Plan  Subtle  slight hyperdensity in left parietal sulcus suggests very subtle subarachnoid hemorrhage.  Non-operative management.   Repeat interval CT imaging of the brain stable.  Post traumatic pharmacologic seizure prophylaxis for 1 week.  Speech Language Pathology cognitive evaluation.  Enma Abdalla MD. Neurosurgeon. [facility].    History of atrial fibrillation- (present on admission)  Assessment & Plan  Sinus rhythm on arrival to ED  No home medication.  Cardiac monitoring.    Contraindication to deep vein thrombosis (DVT) prophylaxis- (present on admission)  Assessment & Plan  VTE prophylaxis initially contraindicated secondary to elevated bleeding risk.  9/1 Trauma surveillance venous duplex ultrasonography ordered.   8/31 Start lovenox.     Acute alcohol intoxication (HCC)- (present on admission)  Assessment & Plan  Admission blood alcohol level of 233.  Alcohol withdrawal surveillance.    Paresthesia  Assessment & Plan  Upper extremity weakness and paresthesias with cervical spine tenderness.   MRI cervical spine ordered.         Discussed patient condition with RN, RT, Therapies, Pharmacy, and Patient.  CRITICAL CARE TIME EXCLUDING PROCEDURES: 55    minutes

## 2023-08-31 NOTE — FLOWSHEET NOTE
08/31/23 0731   Spontaneous Breathing Trial (SBT)   Safety screen spontaneous breathing trial (SBT) Proceed with SBT - no exclusion criteria met   Spontaneous breathing trial (SBT) outcome Apnea > 30 seconds X 4 - SBT Failure  (decreased alarms to help with pt success,  spent a while waking pt up, building up co2, pt still has apnea. pt takes big breaths 1500 then has apnea. 6 breaths per minute. tried spont 2x this morning)

## 2023-08-31 NOTE — PROGRESS NOTES
Neurosurgery Progress Note    Subjective:  Intubated, trying to write with finger.  Restless, frustrated with communication.      Exam:  Awake, intubated, follows commands.  PERRL.  ALBARADO with good strength.  C-collar in place.    BP  Min: 88/52  Max: 133/97  Pulse  Av.4  Min: 54  Max: 101  Resp  Av.3  Min: 8  Max: 36  Temp  Av.6 °C (96 °F)  Min: 35.6 °C (96 °F)  Max: 35.6 °C (96 °F)  Monitored Temp 2  Av.2 °C (99 °F)  Min: 35.2 °C (95.4 °F)  Max: 38.1 °C (100.6 °F)  SpO2  Av %  Min: 100 %  Max: 100 %    No data recorded    Recent Labs     23   WBC 2.8*   RBC 4.14*   HEMOGLOBIN 13.1   HEMATOCRIT 39.6   MCV 95.7   MCH 31.6   MCHC 33.1   RDW 42.0   PLATELETCT 200   MPV 10.9     Recent Labs     23  0519   SODIUM 141 142   POTASSIUM 3.4* 4.0   CHLORIDE 105 107   CO2 20 20   GLUCOSE 103* 88   BUN 6* 4*   CREATININE 0.63 0.67   CALCIUM 8.5 8.6     Recent Labs     23   APTT 25.5   INR 0.96     Recent Labs     23   REACTMIN 1.9*   CLOTKINET 1.8   CLOTANGL 70.8   MAXCLOTS 51.1*   HMA83VRL 1.3   PRCINADP 5.6   PRCINAA 2.2       Intake/Output                         23 07 - 23 0659 23 07 - 23 0659     8640-0986 0131-1224 Total  Total                 Intake    I.V.  --  3909.3 3909.3  331  -- 331    Pre-Hospital Volume -- 1200 1200 -- -- --    Trauma Resuscitation Volume -- 0 0 -- -- --    Magnesium Sulfate Volume -- -- -- 21.9 -- 21.9    Propofol Volume -- 27.9 27.9 1.6 -- 1.6    Volume (mL) (NS infusion) -- 681.4 681.4 307.5 -- 307.5    Volume (mL) (lactated ringers infusion (BOLUS)) -- 1000 1000 -- -- --    Volume (mL) (lactated ringer BOLUS infusion) -- 1000 1000 -- -- --    Blood  --  0 0  --  -- --    PRBC Total Volume (Non-Barcoded) -- 0 0 -- -- --    FFP Total Volume (Non-Barcoded) -- 0 0 -- -- --    Platelets Total Volume (Non-Barcoded) -- 0 0 -- -- --    Cryoprecipitate (Pooled) Total Volume  (Non-Barcoded) -- 0 0 -- -- --    IV Piggyback  --  -- --  50.7  -- 50.7    Volume (mL) (potassium phosphate 15 mmol in  mL ivpb) -- -- -- 50.7 -- 50.7    Total Intake -- 3909.3 3909.3 381.6 -- 381.6       Output    Urine  --  1600 1600  825  -- 825    Output (mL) (Urethral Catheter) -- 1600 1600 825 -- 825    Other  --  0 0  --  -- --    Pre-Hospital Output -- 0 0 -- -- --    Trauma Resuscitation Output -- 0 0 -- -- --    Blood  --  0 0  --  -- --    Est. Blood Loss -- 0 0 -- -- --    Total Output -- 1600 1600 825 -- 825       Net I/O     -- 2309.3 2309.3 -443.4 -- -443.4              Intake/Output Summary (Last 24 hours) at 8/31/2023 1201  Last data filed at 8/31/2023 1000  Gross per 24 hour   Intake 4290.88 ml   Output 2425 ml   Net 1865.88 ml             potassium phosphate 15 mmol in  mL ivpb  15 mmol Once    enoxaparin (LOVENOX) injection  30 mg Q12HRS    Respiratory Therapy Consult   Continuous RT    Pharmacy Consult Request  1 Each PHARMACY TO DOSE    ondansetron  4 mg Q4HRS PRN    ondansetron  4 mg Q4HRS PRN    docusate sodium  100 mg BID    senna-docusate  1 Tablet Nightly    senna-docusate  1 Tablet Q24HRS PRN    polyethylene glycol/lytes  1 Packet BID    magnesium hydroxide  30 mL DAILY    bisacodyl  10 mg Q24HRS PRN    sodium phosphate  1 Each Once PRN    NS   Continuous    oxyCODONE immediate-release  5 mg Q3HRS PRN    Or    fentaNYL  50 mcg Q HOUR PRN    famotidine  20 mg BID    Or    famotidine  20 mg BID    Respiratory Therapy Consult   Continuous RT    propofol  0-80 mcg/kg/min (Ideal) Continuous    levETIRAcetam (Keppra) IV  500 mg Q12HRS     Toshia Rodriguez is a 53 y.o. female presenting with a small traumatic subarachnoid hemorrhage related to a fall from height.      Assessment and Plan:  Hospital day #2  POD #n/a  Prophylactic anticoagulation: yes         Start date/time: today     - No acute neurosurgical intervention at this time.  - Follow-up head CT stable, reviewed by   Lianne.  - Ok for Q4 neuro checks.  - Wean to extubate as tolerated.  - Ok to transfer out of the ICU when cleared by trauma.  - Ok to clear c-collar when pt is extubated and able to answer questions.  - Keppra 500 mg twice daily.    Please call with questions.    ELMER Soni    My total time spent caring for the patient on the day of the encounter was 35 minutes.   This does not include time spent on separately billable procedures/tests.

## 2023-08-31 NOTE — PROGRESS NOTES
Dr. Abdalla neurosurgeon and [facility] neurosurgery APRN notified of pt's c spine tenderness and upper extremity weakness/tingling. Agree with plan for C spine MRI, bedrest, c collar still in place.

## 2023-08-31 NOTE — CARE PLAN
The patient is Watcher - Medium risk of patient condition declining or worsening    Shift Goals  Clinical Goals: improved neuro exam  Patient Goals: alondra  Family Goals: not present    Progress made toward(s) clinical / shift goals:    Problem: Knowledge Deficit - Standard  Goal: Patient and family/care givers will demonstrate understanding of plan of care, disease process/condition, diagnostic tests and medications  Outcome: Progressing     Problem: Skin Integrity  Goal: Skin integrity is maintained or improved  Outcome: Progressing     Problem: Fall Risk  Goal: Patient will remain free from falls  Outcome: Progressing       Patient is not progressing towards the following goals:

## 2023-08-31 NOTE — CARE PLAN
The patient is Watcher - Medium risk of patient condition declining or worsening    Shift Goals  Clinical Goals: Improve alertness while staying calm, wean to extubate, reorient patient  Patient Goals: unable to assess  Family Goals: no family present    Progress made toward(s) clinical / shift goals:  Pt intermittently lethargic/restless. Reorienting patient to situation and place. Collab with RT for vent weaning.      Problem: Knowledge Deficit - Standard  Goal: Patient and family/care givers will demonstrate understanding of plan of care, disease process/condition, diagnostic tests and medications  Outcome: Progressing     Problem: Skin Integrity  Goal: Skin integrity is maintained or improved  Outcome: Progressing     Problem: Safety - Medical Restraint  Goal: Remains free of injury from restraints (Restraint for Interference with Medical Device)  Outcome: Progressing     Problem: Pain - Standard  Goal: Alleviation of pain or a reduction in pain to the patient’s comfort goal  Outcome: Progressing       Patient is not progressing towards the following goals:      Problem: Safety - Medical Restraint  Goal: Free from restraint(s) (Restraint for Interference with Medical Device)  Outcome: Not Met  Remains in restraints with risk for removal of breathing tube.
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
## 2023-08-31 NOTE — ASSESSMENT & PLAN NOTE
Upper extremity weakness and paresthesias with cervical spine tenderness.   MRI cervical spine normal.

## 2023-08-31 NOTE — RESPIRATORY CARE
Patient arrived to ED intubated with 7.5 ETT, 28 cm at the teeth. ETT pulled back to 23 cm per MD after x ray was taken. EMS tube ricardo removed and commercial gemini was placed.
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
## 2023-08-31 NOTE — PROGRESS NOTES
Med rec complete per family , [facility], Boyfriend by phone.   Per family , Pt's name spelled wrong. I updated RN and PAR.   Family does not know of any medications that Pt is on, But BF thinks Pt on medication for anxiety or PTSD.  Allergies reviewed  Per [facility], They have only filled Temovate in July for PT.  Pt's name Jenelle Menendez
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
## 2023-08-31 NOTE — THERAPY
Speech Language Therapy Contact Note    Patient Name: Toshia Rodriguez  Age:  53 y.o., Sex:  female  Medical Record #: [id number]  Today's Date: 8/31/2023 08/31/23 0816   Treatment Variance   Reason For Missed Therapy Medical - Patient on Hold from Therapy   Initial Contact Note    Initial Contact Note  Order Received and Verified, Speech Therapy Evaluation in Progress with Full Report to Follow.   Interdisciplinary Plan of Care Collaboration   Collaboration Comments Orders received for cognitive evaluation. Patient currently intubated, thus will hold order. Consider swallow evaluation upon extubation as indicated.

## 2023-08-31 NOTE — PROGRESS NOTES
Patient arrived to t926 at 2231    HR 66  /97  Temp 95.5f  SpO2 100% on 50% vented  Weight 60.3kg    No gtts    4 Eyes Skin Assessment Completed by ERI Bravo and ERI Palacio.    Head WDL  Ears WDL  Nose WDL  Mouth WDL  Neck WDL  Breast/Chest WDL  Shoulder Blades WDL  Spine WDL  (R) Arm/Elbow/Hand WDL  (L) Arm/Elbow/Hand tear left pinky, abrasion to wrist  Abdomen WDL  Groin WDL  Scrotum/Coccyx/Buttocks red, blanchable  (R) Leg Abrasion  (L) Leg Abrasion  (R) Heel/Foot/Toe abrasions  (L) Heel/Foot/Toe abrasions          Devices In Places ECG, Tele Box, Pulse Ox, Hernandez, SCD's, ET Tube, OG/NG, and Cervical Collar      Interventions In Place Sacral Mepilex, TAP System, Pillows, Q2 Turns, Heels Loaded W/Pillows, and Pressure Redistribution Mattress    Possible Skin Injury No    Pictures Uploaded Into Epic N/A  Wound Consult Placed N/A  RN Wound Prevention Protocol Ordered Yes      Belongings: pink dress, burning man wrist band, green purse, lip balm, packet sweet relish, blue lace garment, blue suede boots, earrings in cup, choker, hair clip, cellphone
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
## 2023-08-31 NOTE — CARE PLAN
Problem: Ventilation  Goal: Ability to achieve and maintain unassisted ventilation or tolerate decreased levels of ventilator support  Description: Target End Date:  4 days     Document on Vent flowsheet    1.  Support and monitor invasive and noninvasive mechanical ventilation  2.  Monitor ventilator weaning response  3.  Perform ventilator associated pneumonia prevention interventions  4.  Manage ventilation therapy by monitoring diagnostic test results  Outcome: Not Met                           Ventilator Daily Summary     Vent Day #2    Ventilator settings: /+8/30%     Weaning trials:N/A     Respiratory Procedures: N/A     Plan: Continue current ventilator settings and wean mechanical ventilation as tolerated per physician orders.
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
## 2023-08-31 NOTE — ASSESSMENT & PLAN NOTE
Intubated at [facility] for airway protection.  Continue full mechanical ventilatory support.   Ventilator bundle and Trauma weaning protocol.  8/31 Extubated.

## 2023-08-31 NOTE — ASSESSMENT & PLAN NOTE
Subtle slight hyperdensity in left parietal sulcus suggests very subtle subarachnoid hemorrhage.  Non-operative management.   Repeat interval CT imaging of the brain stable.  Post traumatic pharmacologic seizure prophylaxis for 1 week.  Speech Language Pathology cognitive evaluation.  Enma Abdalla MD. Neurosurgeon. [facility].

## 2023-08-31 NOTE — PROGRESS NOTES
"      Mental status adequate for full examination?: No    Spine cleared (radiologically and/or clinically): No    REVIEW OF SYSTEMS:  Review of Systems   Unable to perform ROS: Intubated       PHYSICAL EXAMINATION:  /62   Pulse 99   Temp (!) 35.6 °C (96 °F)   Resp 15   Ht 1.651 m (5' 5\")   Wt 60.2 kg (132 lb 11.5 oz)   SpO2 100%   BMI 22.09 kg/m²   Physical Exam  Vitals and nursing note reviewed.   Constitutional:       General: She is sleeping.      Appearance: Normal appearance.      Interventions: She is sedated, chemically paralyzed and restrained. Cervical collar in place.   HENT:      Head: Normocephalic.      Mouth/Throat:      Mouth: Mucous membranes are moist.   Neck:      Comments: collar  Cardiovascular:      Rate and Rhythm: Normal rate.      Pulses: Normal pulses.   Pulmonary:      Effort: No respiratory distress.      Breath sounds: No stridor. No rhonchi.   Abdominal:      General: Abdomen is flat.      Palpations: Abdomen is soft.   Genitourinary:     Comments: Hernandez to gravity drain   Skin:     General: Skin is warm and dry.      Capillary Refill: Capillary refill takes less than 2 seconds.      Findings: Abrasion present.         LABORATORY VALUES:  Recent Labs     08/30/23  2300   WBC 2.8*   RBC 4.14*   HEMOGLOBIN 13.1   HEMATOCRIT 39.6   MCV 95.7   MCH 31.6   MCHC 33.1   RDW 42.0   PLATELETCT 200   MPV 10.9     Recent Labs     08/30/23 2214 08/31/23  0519   SODIUM 141 142   POTASSIUM 3.4* 4.0   CHLORIDE 105 107   CO2 20 20   GLUCOSE 103* 88   BUN 6* 4*   CREATININE 0.63 0.67   CALCIUM 8.5 8.6     Recent Labs     08/30/23 2214 08/30/23  2300 08/31/23  0519   ASTSGOT 32  --  38   ALTSGPT 26  --  28   TBILIRUBIN 0.4  --  0.2   ALKPHOSPHAT 42  --  41   GLOBULIN 2.0  --  2.0   INR  --  0.96  --      Recent Labs     08/30/23  2300   APTT 25.5   INR 0.96       IMAGING:  DX-CHEST-PORTABLE (1 VIEW)   Final Result         1.  No acute cardiopulmonary disease.      CT-HEAD W/O   Final Result "         1.  Subtle left parietal subarachnoid hemorrhage, stable since prior study.   2.  Atherosclerosis.         CT-LSPINE W/O PLUS RECONS   Final Result         1.  No acute traumatic bony injury of the lumbar spine.      CT-TSPINE W/O PLUS RECONS   Final Result         1.  No acute traumatic bony injury of the thoracic spine.      CT-CSPINE WITHOUT PLUS RECONS   Final Result         1.  No acute traumatic bony injury of the cervical spine is apparent.      CT-HEAD W/O   Final Result         1.  Subtle slight hyperdensity in left parietal sulcus suggests very subtle subarachnoid hemorrhage.      Based solely on CT findings, the brain injury guideline category is mBIG 1.      SDH < 4mm   IPH < 4mm   SAH < 3 sulci and < 1mm      The original BIG retrospective analysis found radiographic progression in 0% of BIG 1 patients and 2.6% BIG 2.      These findings were discussed with the patient's clinician, Nhan Dunham, on 8/30/2023 11:06 PM.      CT-CHEST,ABDOMEN,PELVIS WITH   Final Result         1.  No acute abnormality in thorax, abdomen and pelvis CT scan.   2.  Hepatomegaly      DX-PELVIS-1 OR 2 VIEWS   Final Result         1.  No acute traumatic bony injury.      DX-CHEST-LIMITED (1 VIEW)   Final Result         1.  No acute cardiopulmonary disease.   2.  Right mainstem intubation, recommend withdrawal 4-5 cm      These findings were discussed with the patient's clinician, Tigre Godinez, on 8/30/2023 10:30 PM.      US-ABORTED US PROCEDURE    (Results Pending)   US-TRAUMA VEIN SCREEN LOWER BILAT EXTREMITY    (Results Pending)       All current laboratory studies/radiology exams reviewed: Yes    Medications reconciliation has been reviewed: No medications reported    Completed Consultations:   Neurosurgery     Pending Consultations:      Newly identified diagnoses, injuries and/or co-morbidities:  Unable to fully assess due to sedation  No overt bony deformity     RAP Score Total: 6       Blood Alcohol>0.08: yes        Reason for no ETOH Intervention: Intubated  Intervention: Not Warranted.       Discussed patient condition with RN, Charge nurse / hot rounds, and trauma surgery Dr. Zhou .

## 2023-08-31 NOTE — RESPIRATORY CARE
Extubation    Cuff leak noted yes    Stridor present no     FiO2%: 30 % (08/31/23 1000)  O2 (LPM): 2 (08/31/23 0800)     Patient toleration well  RCP Complete? Yes    Events/Summary/Plan: Extubation (08/31/23 1105)

## 2023-08-31 NOTE — PROGRESS NOTES
Neurosurgery APRN at bedside.    Okay from their perspective to extubate patient when ready.   Q4h neuro checks and transfer out when ready.  Okay for DVT prophylaxis.

## 2023-09-01 ENCOUNTER — APPOINTMENT (OUTPATIENT)
Dept: RADIOLOGY | Facility: MEDICAL CENTER | Age: 53
DRG: 085 | End: 2023-09-01
Attending: SURGERY
Payer: MEDICAID

## 2023-09-01 PROBLEM — E87.6 HYPOKALEMIA: Status: ACTIVE | Noted: 2023-09-01

## 2023-09-01 PROBLEM — R20.2 PARESTHESIA: Status: RESOLVED | Noted: 2023-08-31 | Resolved: 2023-09-01

## 2023-09-01 LAB
ALBUMIN SERPL BCP-MCNC: 3.4 G/DL (ref 3.2–4.9)
ALBUMIN/GLOB SERPL: 2 G/DL
ALP SERPL-CCNC: 39 U/L (ref 30–99)
ALT SERPL-CCNC: 17 U/L (ref 2–50)
ANION GAP SERPL CALC-SCNC: 9 MMOL/L (ref 7–16)
AST SERPL-CCNC: 21 U/L (ref 12–45)
BASOPHILS # BLD AUTO: 0.4 % (ref 0–1.8)
BASOPHILS # BLD: 0.02 K/UL (ref 0–0.12)
BILIRUB SERPL-MCNC: 0.5 MG/DL (ref 0.1–1.5)
BUN SERPL-MCNC: 4 MG/DL (ref 8–22)
CALCIUM ALBUM COR SERPL-MCNC: 8.2 MG/DL (ref 8.5–10.5)
CALCIUM SERPL-MCNC: 7.7 MG/DL (ref 8.5–10.5)
CHLORIDE SERPL-SCNC: 109 MMOL/L (ref 96–112)
CO2 SERPL-SCNC: 22 MMOL/L (ref 20–33)
CREAT SERPL-MCNC: 0.58 MG/DL (ref 0.5–1.4)
EKG IMPRESSION: NORMAL
EOSINOPHIL # BLD AUTO: 0.05 K/UL (ref 0–0.51)
EOSINOPHIL NFR BLD: 1 % (ref 0–6.9)
ERYTHROCYTE [DISTWIDTH] IN BLOOD BY AUTOMATED COUNT: 44.2 FL (ref 35.9–50)
GFR SERPLBLD CREATININE-BSD FMLA CKD-EPI: 109 ML/MIN/1.73 M 2
GLOBULIN SER CALC-MCNC: 1.7 G/DL (ref 1.9–3.5)
GLUCOSE SERPL-MCNC: 105 MG/DL (ref 65–99)
HCT VFR BLD AUTO: 30 % (ref 37–47)
HGB BLD-MCNC: 10.1 G/DL (ref 12–16)
IMM GRANULOCYTES # BLD AUTO: 0.01 K/UL (ref 0–0.11)
IMM GRANULOCYTES NFR BLD AUTO: 0.2 % (ref 0–0.9)
LACTATE SERPL-SCNC: 0.9 MMOL/L (ref 0.5–2)
LYMPHOCYTES # BLD AUTO: 1.28 K/UL (ref 1–4.8)
LYMPHOCYTES NFR BLD: 26.7 % (ref 22–41)
MCH RBC QN AUTO: 31.8 PG (ref 27–33)
MCHC RBC AUTO-ENTMCNC: 33.7 G/DL (ref 32.2–35.5)
MCV RBC AUTO: 94.3 FL (ref 81.4–97.8)
MONOCYTES # BLD AUTO: 0.44 K/UL (ref 0–0.85)
MONOCYTES NFR BLD AUTO: 9.2 % (ref 0–13.4)
NEUTROPHILS # BLD AUTO: 2.99 K/UL (ref 1.82–7.42)
NEUTROPHILS NFR BLD: 62.5 % (ref 44–72)
NRBC # BLD AUTO: 0 K/UL
NRBC BLD-RTO: 0 /100 WBC (ref 0–0.2)
PLATELET # BLD AUTO: 141 K/UL (ref 164–446)
PMV BLD AUTO: 11.2 FL (ref 9–12.9)
POTASSIUM SERPL-SCNC: 3.3 MMOL/L (ref 3.6–5.5)
PROT SERPL-MCNC: 5.1 G/DL (ref 6–8.2)
RBC # BLD AUTO: 3.18 M/UL (ref 4.2–5.4)
SODIUM SERPL-SCNC: 140 MMOL/L (ref 135–145)
WBC # BLD AUTO: 4.8 K/UL (ref 4.8–10.8)

## 2023-09-01 PROCEDURE — 92610 EVALUATE SWALLOWING FUNCTION: CPT

## 2023-09-01 PROCEDURE — A9270 NON-COVERED ITEM OR SERVICE: HCPCS | Mod: JZ | Performed by: NURSE PRACTITIONER

## 2023-09-01 PROCEDURE — 700111 HCHG RX REV CODE 636 W/ 250 OVERRIDE (IP): Performed by: NURSE PRACTITIONER

## 2023-09-01 PROCEDURE — 700105 HCHG RX REV CODE 258: Performed by: SURGERY

## 2023-09-01 PROCEDURE — 97162 PT EVAL MOD COMPLEX 30 MIN: CPT

## 2023-09-01 PROCEDURE — 700102 HCHG RX REV CODE 250 W/ 637 OVERRIDE(OP): Mod: JZ | Performed by: NURSE PRACTITIONER

## 2023-09-01 PROCEDURE — 83605 ASSAY OF LACTIC ACID: CPT

## 2023-09-01 PROCEDURE — 99232 SBSQ HOSP IP/OBS MODERATE 35: CPT | Performed by: PHYSICIAN ASSISTANT

## 2023-09-01 PROCEDURE — 770006 HCHG ROOM/CARE - MED/SURG/GYN SEMI*

## 2023-09-01 PROCEDURE — A9270 NON-COVERED ITEM OR SERVICE: HCPCS | Mod: JZ | Performed by: PHYSICIAN ASSISTANT

## 2023-09-01 PROCEDURE — 97165 OT EVAL LOW COMPLEX 30 MIN: CPT

## 2023-09-01 PROCEDURE — 80053 COMPREHEN METABOLIC PANEL: CPT

## 2023-09-01 PROCEDURE — 71045 X-RAY EXAM CHEST 1 VIEW: CPT

## 2023-09-01 PROCEDURE — 700102 HCHG RX REV CODE 250 W/ 637 OVERRIDE(OP): Performed by: NURSE PRACTITIONER

## 2023-09-01 PROCEDURE — A9270 NON-COVERED ITEM OR SERVICE: HCPCS | Performed by: SURGERY

## 2023-09-01 PROCEDURE — 700102 HCHG RX REV CODE 250 W/ 637 OVERRIDE(OP): Mod: JZ | Performed by: PHYSICIAN ASSISTANT

## 2023-09-01 PROCEDURE — 700111 HCHG RX REV CODE 636 W/ 250 OVERRIDE (IP): Performed by: PHYSICIAN ASSISTANT

## 2023-09-01 PROCEDURE — 93005 ELECTROCARDIOGRAM TRACING: CPT | Performed by: NURSE PRACTITIONER

## 2023-09-01 PROCEDURE — 92523 SPEECH SOUND LANG COMPREHEN: CPT

## 2023-09-01 PROCEDURE — 85025 COMPLETE CBC W/AUTO DIFF WBC: CPT

## 2023-09-01 PROCEDURE — A9270 NON-COVERED ITEM OR SERVICE: HCPCS | Performed by: NURSE PRACTITIONER

## 2023-09-01 PROCEDURE — 700102 HCHG RX REV CODE 250 W/ 637 OVERRIDE(OP): Performed by: SURGERY

## 2023-09-01 PROCEDURE — 93010 ELECTROCARDIOGRAM REPORT: CPT | Performed by: STUDENT IN AN ORGANIZED HEALTH CARE EDUCATION/TRAINING PROGRAM

## 2023-09-01 PROCEDURE — 700111 HCHG RX REV CODE 636 W/ 250 OVERRIDE (IP): Mod: JZ | Performed by: SURGERY

## 2023-09-01 RX ORDER — ALBUTEROL SULFATE 90 UG/1
1-2 AEROSOL, METERED RESPIRATORY (INHALATION) EVERY 6 HOURS PRN
Status: DISCONTINUED | OUTPATIENT
Start: 2023-09-01 | End: 2023-09-02 | Stop reason: HOSPADM

## 2023-09-01 RX ORDER — POTASSIUM CHLORIDE 20 MEQ/1
20 TABLET, EXTENDED RELEASE ORAL 2 TIMES DAILY
Status: DISCONTINUED | OUTPATIENT
Start: 2023-09-01 | End: 2023-09-02 | Stop reason: HOSPADM

## 2023-09-01 RX ORDER — PROCHLORPERAZINE EDISYLATE 5 MG/ML
10 INJECTION INTRAMUSCULAR; INTRAVENOUS EVERY 6 HOURS PRN
Status: DISCONTINUED | OUTPATIENT
Start: 2023-09-01 | End: 2023-09-02 | Stop reason: HOSPADM

## 2023-09-01 RX ORDER — POTASSIUM CHLORIDE 20 MEQ/1
20 TABLET, EXTENDED RELEASE ORAL 2 TIMES DAILY
Status: DISCONTINUED | OUTPATIENT
Start: 2023-09-01 | End: 2023-09-01

## 2023-09-01 RX ORDER — ALBUTEROL SULFATE 90 UG/1
1-2 AEROSOL, METERED RESPIRATORY (INHALATION) EVERY 6 HOURS PRN
COMMUNITY

## 2023-09-01 RX ADMIN — POTASSIUM CHLORIDE 20 MEQ: 1500 TABLET, EXTENDED RELEASE ORAL at 06:49

## 2023-09-01 RX ADMIN — LEVETIRACETAM 500 MG: 100 INJECTION, SOLUTION, CONCENTRATE INTRAVENOUS at 17:02

## 2023-09-01 RX ADMIN — SODIUM CHLORIDE: 9 INJECTION, SOLUTION INTRAVENOUS at 06:52

## 2023-09-01 RX ADMIN — DOCUSATE SODIUM 100 MG: 100 CAPSULE, LIQUID FILLED ORAL at 04:26

## 2023-09-01 RX ADMIN — ENOXAPARIN SODIUM 30 MG: 100 INJECTION SUBCUTANEOUS at 17:02

## 2023-09-01 RX ADMIN — ENOXAPARIN SODIUM 30 MG: 100 INJECTION SUBCUTANEOUS at 04:26

## 2023-09-01 RX ADMIN — ACETAMINOPHEN 1000 MG: 500 TABLET, FILM COATED ORAL at 03:27

## 2023-09-01 RX ADMIN — PROCHLORPERAZINE EDISYLATE 10 MG: 5 INJECTION INTRAMUSCULAR; INTRAVENOUS at 11:11

## 2023-09-01 RX ADMIN — SODIUM CHLORIDE: 9 INJECTION, SOLUTION INTRAVENOUS at 16:57

## 2023-09-01 RX ADMIN — LEVETIRACETAM 500 MG: 100 INJECTION, SOLUTION, CONCENTRATE INTRAVENOUS at 04:27

## 2023-09-01 RX ADMIN — POTASSIUM CHLORIDE 20 MEQ: 1500 TABLET, EXTENDED RELEASE ORAL at 17:02

## 2023-09-01 RX ADMIN — MAGNESIUM HYDROXIDE 30 ML: 400 SUSPENSION ORAL at 04:26

## 2023-09-01 RX ADMIN — POLYETHYLENE GLYCOL 3350 1 PACKET: 17 POWDER, FOR SOLUTION ORAL at 04:38

## 2023-09-01 RX ADMIN — ONDANSETRON 4 MG: 2 INJECTION INTRAMUSCULAR; INTRAVENOUS at 08:37

## 2023-09-01 ASSESSMENT — PAIN DESCRIPTION - PAIN TYPE
TYPE: ACUTE PAIN
TYPE: ACUTE PAIN

## 2023-09-01 ASSESSMENT — COGNITIVE AND FUNCTIONAL STATUS - GENERAL
SUGGESTED CMS G CODE MODIFIER DAILY ACTIVITY: CH
MOBILITY SCORE: 24
SUGGESTED CMS G CODE MODIFIER MOBILITY: CH
DAILY ACTIVITIY SCORE: 24

## 2023-09-01 ASSESSMENT — FIBROSIS 4 INDEX: FIB4 SCORE: 1.84

## 2023-09-01 ASSESSMENT — ENCOUNTER SYMPTOMS
NAUSEA: 1
COUGH: 0
FEVER: 0
FOCAL WEAKNESS: 0
MYALGIAS: 0
HEADACHES: 1
SENSORY CHANGE: 0
VOMITING: 0
ABDOMINAL PAIN: 0
CHILLS: 0
ROS GI COMMENTS: BM 9/1
SPUTUM PRODUCTION: 0

## 2023-09-01 ASSESSMENT — GAIT ASSESSMENTS
GAIT LEVEL OF ASSIST: SUPERVISED
DISTANCE (FEET): 100
DEVIATION: DECREASED BASE OF SUPPORT;BRADYKINETIC

## 2023-09-01 ASSESSMENT — ACTIVITIES OF DAILY LIVING (ADL): TOILETING: INDEPENDENT

## 2023-09-01 NOTE — PROGRESS NOTES
Notified trauma doctor, Adriana Kimbrough, pt's potassium this AM=3.3, replacement ordered; lactic down to 0.9; instructed to order EKG if next SBP is <90
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
## 2023-09-01 NOTE — CARE PLAN
The patient is Stable - Low risk of patient condition declining or worsening    Shift Goals  Clinical Goals: neuro checks; increase po fluids; IVF; monitor for sz activity  Patient Goals: rest; feel better;  Family Goals: no family present    Progress made toward(s) clinical / shift goals:    Problem: Knowledge Deficit - Standard  Goal: Patient and family/care givers will demonstrate understanding of plan of care, disease process/condition, diagnostic tests and medications  Description: Target End Date:  1-3 days or as soon as patient condition allows    Document in Patient Education    1.  Patient and family/caregiver oriented to unit, equipment, visitation policy and means for communicating concern  2.  Complete/review Learning Assessment  3.  Assess knowledge level of disease process/condition, treatment plan, diagnostic tests and medications  4.  Explain disease process/condition, treatment plan, diagnostic tests and medications  Outcome: Progressing    Problem: Skin Integrity  Goal: Skin integrity is maintained or improved  Description: Target End Date:  Prior to discharge or change in level of care    Document interventions on Skin Risk/Ricardo flowsheet groups and corresponding LDA    1.  Assess and monitor skin integrity, appearance and/or temperature  2.  Assess risk factors for impaired skin integrity and/or pressures ulcers  3.  Implement precautions to protect skin integrity in collaboration with interdisciplinary team  4.  Implement pressure ulcer prevention protocol if at risk for skin breakdown  5.  Confirm wound care consult if at risk for skin breakdown  6.  Ensure patient use of pressure relieving devices  (Low air loss bed, waffle overlay, heel protectors, ROHO cushion, etc)  Outcome: Progressing     Problem: Fall Risk  Goal: Patient will remain free from falls  Description: Target End Date:  Prior to discharge or change in level of care    Document interventions on the Shira Ramsey Fall Risk  Assessment    1.  Assess for fall risk factors  2.  Implement fall precautions  Outcome: Progressing     Problem: Pain - Standard  Goal: Alleviation of pain or a reduction in pain to the patient’s comfort goal  Description: Target End Date:  Prior to discharge or change in level of care    Document on Vitals flowsheet    1.  Document pain using the appropriate pain scale per order or unit policy  2.  Educate and implement non-pharmacologic comfort measures (i.e. relaxation, distraction, massage, cold/heat therapy, etc.)  3.  Pain management medications as ordered  4.  Reassess pain after pain med administration per policy  5.  If opiods administered assess patient's response to pain medication is appropriate per POSS sedation scale  6.  Follow pain management plan developed in collaboration with patient and interdisciplinary team (including palliative care or pain specialists if applicable)  Outcome: Progressing     Problem: Knowledge Deficit - Seizures  Goal: Knowledge of seizure treatment regimen will improve  Description: Target End Date:  1-3 days or as soon as patient condition allows    Document in Patient Education    1.  Educate patient and family/caregiver of importance of seizure precautions in hospital  2.  Educate patient and family/caregiver to report auras and triggers prior to events/ EMU event reporting  3.  Review anticonvulsant medication regimen and side effects  Outcome: Progressing          Patient is not progressing towards the following goals:

## 2023-09-01 NOTE — PROGRESS NOTES
4 Eyes Skin Assessment Completed by ERI alba and ERI nielson.    Head WDL  Ears WDL  Nose WDL  Mouth WDL  Neck WDL  Breast/Chest Redness  Shoulder Blades WDL  Spine WDL  (R) Arm/Elbow/Hand Bruising and Scab  (L) Arm/Elbow/Hand Bruising and Scab  Abdomen WDL  Groin Redness  Scrotum/Coccyx/Buttocks Redness and Blanching  (R) Leg WDL  (L) Leg WDL  (R) Heel/Foot/Toe WDL  (L) Heel/Foot/Toe WDL          Devices In Places Pulse Ox      Interventions In Place Pillows    Possible Skin Injury No    Pictures Uploaded Into Epic N/A  Wound Consult Placed N/A  RN Wound Prevention Protocol Ordered No
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
## 2023-09-01 NOTE — CARE PLAN
The patient is Stable - Low risk of patient condition declining or worsening    Shift Goals  Clinical Goals: Hemodynamic stability, monitor neuro status, improved nausea  Patient Goals: Rest  Family Goals: ARNAUD    Progress made toward(s) clinical / shift goals:     Problem: Fall Risk  Goal: Patient will remain free from falls  Outcome: Progressing  Note: Bed in the low position and locked, bed alarm on, and call light within reach. Patient calls for assistance.     Problem: Bowel Elimination  Goal: Establish and maintain regular bowel function  Outcome: Progressing  Note: Patient had a bowel movement during the shift. Patient was encouraged to maintain adequate hydration and to mobilize frequently with assistance of nursing staff.      Problem: Gastrointestinal Irritability  Goal: Nausea and vomiting will be absent or improve  Outcome: Progressing  Note: Patient complained of nausea. RN administered IV Zofran. Patient stated she still had nausea so RN administered Compazine per orders. Patient stated improvement in nausea.      Patient is not progressing towards the following goals: N/A

## 2023-09-01 NOTE — PROGRESS NOTES
C-spine MRI reviewed by tsering John.  OK to dc from neurosurgical standpoint.  No need to follow-up in clinic.    Please call with any further questions of concerns.    ELMER Soni

## 2023-09-01 NOTE — PROGRESS NOTES
Notified Scarlet Kimbrough trauma MD, pt BP @ 2134 was 85/48, IV NS restarted at 100 ml/hr; current BP 84/49, manual BP 92/52. No new orders.
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
## 2023-09-01 NOTE — ASSESSMENT & PLAN NOTE
Date of admission: 8/30/2023.  8/31 Transfer orders from SICU.  Cleared for discharge: No.  Discharge delayed: No.  Discharge date: tbd.

## 2023-09-02 ENCOUNTER — PHARMACY VISIT (OUTPATIENT)
Dept: PHARMACY | Facility: MEDICAL CENTER | Age: 53
End: 2023-09-02
Payer: COMMERCIAL

## 2023-09-02 ENCOUNTER — APPOINTMENT (OUTPATIENT)
Dept: RADIOLOGY | Facility: MEDICAL CENTER | Age: 53
DRG: 085 | End: 2023-09-02
Attending: SURGERY
Payer: MEDICAID

## 2023-09-02 VITALS
WEIGHT: 142.64 LBS | RESPIRATION RATE: 16 BRPM | HEART RATE: 57 BPM | DIASTOLIC BLOOD PRESSURE: 59 MMHG | HEIGHT: 65 IN | OXYGEN SATURATION: 95 % | TEMPERATURE: 99.2 F | BODY MASS INDEX: 23.76 KG/M2 | SYSTOLIC BLOOD PRESSURE: 98 MMHG

## 2023-09-02 LAB
ALBUMIN SERPL BCP-MCNC: 3.2 G/DL (ref 3.2–4.9)
ALBUMIN/GLOB SERPL: 1.5 G/DL
ALP SERPL-CCNC: 36 U/L (ref 30–99)
ALT SERPL-CCNC: 21 U/L (ref 2–50)
ANION GAP SERPL CALC-SCNC: 8 MMOL/L (ref 7–16)
AST SERPL-CCNC: 26 U/L (ref 12–45)
BASOPHILS # BLD AUTO: 0.3 % (ref 0–1.8)
BASOPHILS # BLD: 0.01 K/UL (ref 0–0.12)
BILIRUB SERPL-MCNC: 0.3 MG/DL (ref 0.1–1.5)
BUN SERPL-MCNC: 4 MG/DL (ref 8–22)
CALCIUM ALBUM COR SERPL-MCNC: 8.8 MG/DL (ref 8.5–10.5)
CALCIUM SERPL-MCNC: 8.2 MG/DL (ref 8.5–10.5)
CHLORIDE SERPL-SCNC: 113 MMOL/L (ref 96–112)
CO2 SERPL-SCNC: 20 MMOL/L (ref 20–33)
CREAT SERPL-MCNC: 0.65 MG/DL (ref 0.5–1.4)
EOSINOPHIL # BLD AUTO: 0.11 K/UL (ref 0–0.51)
EOSINOPHIL NFR BLD: 3.6 % (ref 0–6.9)
ERYTHROCYTE [DISTWIDTH] IN BLOOD BY AUTOMATED COUNT: 45.1 FL (ref 35.9–50)
GFR SERPLBLD CREATININE-BSD FMLA CKD-EPI: 106 ML/MIN/1.73 M 2
GLOBULIN SER CALC-MCNC: 2.1 G/DL (ref 1.9–3.5)
GLUCOSE SERPL-MCNC: 107 MG/DL (ref 65–99)
HCT VFR BLD AUTO: 30.9 % (ref 37–47)
HGB BLD-MCNC: 10.1 G/DL (ref 12–16)
IMM GRANULOCYTES # BLD AUTO: 0.01 K/UL (ref 0–0.11)
IMM GRANULOCYTES NFR BLD AUTO: 0.3 % (ref 0–0.9)
LYMPHOCYTES # BLD AUTO: 1.17 K/UL (ref 1–4.8)
LYMPHOCYTES NFR BLD: 38.6 % (ref 22–41)
MAGNESIUM SERPL-MCNC: 2.1 MG/DL (ref 1.5–2.5)
MCH RBC QN AUTO: 31.5 PG (ref 27–33)
MCHC RBC AUTO-ENTMCNC: 32.7 G/DL (ref 32.2–35.5)
MCV RBC AUTO: 96.3 FL (ref 81.4–97.8)
MONOCYTES # BLD AUTO: 0.29 K/UL (ref 0–0.85)
MONOCYTES NFR BLD AUTO: 9.6 % (ref 0–13.4)
NEUTROPHILS # BLD AUTO: 1.44 K/UL (ref 1.82–7.42)
NEUTROPHILS NFR BLD: 47.6 % (ref 44–72)
NRBC # BLD AUTO: 0 K/UL
NRBC BLD-RTO: 0 /100 WBC (ref 0–0.2)
PHOSPHATE SERPL-MCNC: 2.3 MG/DL (ref 2.5–4.5)
PLATELET # BLD AUTO: 137 K/UL (ref 164–446)
PMV BLD AUTO: 11.5 FL (ref 9–12.9)
POTASSIUM SERPL-SCNC: 3.7 MMOL/L (ref 3.6–5.5)
PROT SERPL-MCNC: 5.3 G/DL (ref 6–8.2)
RBC # BLD AUTO: 3.21 M/UL (ref 4.2–5.4)
SODIUM SERPL-SCNC: 141 MMOL/L (ref 135–145)
WBC # BLD AUTO: 3 K/UL (ref 4.8–10.8)

## 2023-09-02 PROCEDURE — 700111 HCHG RX REV CODE 636 W/ 250 OVERRIDE (IP): Mod: JZ | Performed by: SURGERY

## 2023-09-02 PROCEDURE — 700102 HCHG RX REV CODE 250 W/ 637 OVERRIDE(OP): Mod: JZ | Performed by: PHYSICIAN ASSISTANT

## 2023-09-02 PROCEDURE — 83735 ASSAY OF MAGNESIUM: CPT

## 2023-09-02 PROCEDURE — 700105 HCHG RX REV CODE 258: Performed by: SURGERY

## 2023-09-02 PROCEDURE — A9270 NON-COVERED ITEM OR SERVICE: HCPCS | Mod: JZ | Performed by: PHYSICIAN ASSISTANT

## 2023-09-02 PROCEDURE — 80053 COMPREHEN METABOLIC PANEL: CPT

## 2023-09-02 PROCEDURE — 71045 X-RAY EXAM CHEST 1 VIEW: CPT

## 2023-09-02 PROCEDURE — 84100 ASSAY OF PHOSPHORUS: CPT

## 2023-09-02 PROCEDURE — 85025 COMPLETE CBC W/AUTO DIFF WBC: CPT

## 2023-09-02 PROCEDURE — 700111 HCHG RX REV CODE 636 W/ 250 OVERRIDE (IP): Performed by: NURSE PRACTITIONER

## 2023-09-02 PROCEDURE — RXMED WILLOW AMBULATORY MEDICATION CHARGE: Performed by: PHYSICIAN ASSISTANT

## 2023-09-02 RX ORDER — LORAZEPAM 0.5 MG/1
0.5 TABLET ORAL ONCE
Qty: 1 TABLET | Refills: 0 | Status: SHIPPED | OUTPATIENT
Start: 2023-09-02 | End: 2023-09-03

## 2023-09-02 RX ORDER — OXYCODONE HYDROCHLORIDE 5 MG/1
5 TABLET ORAL
Qty: 30 TABLET | Refills: 0 | Status: CANCELLED | OUTPATIENT
Start: 2023-09-02

## 2023-09-02 RX ORDER — LEVETIRACETAM 500 MG/1
500 TABLET ORAL 2 TIMES DAILY
Qty: 10 TABLET | Refills: 0 | Status: SHIPPED | OUTPATIENT
Start: 2023-09-02 | End: 2023-09-07

## 2023-09-02 RX ORDER — ACETAMINOPHEN 500 MG
1000 TABLET ORAL EVERY 6 HOURS PRN
Qty: 30 TABLET | Refills: 0 | COMMUNITY
Start: 2023-09-02

## 2023-09-02 RX ADMIN — SODIUM CHLORIDE: 9 INJECTION, SOLUTION INTRAVENOUS at 03:13

## 2023-09-02 RX ADMIN — ENOXAPARIN SODIUM 30 MG: 100 INJECTION SUBCUTANEOUS at 04:34

## 2023-09-02 RX ADMIN — LEVETIRACETAM 500 MG: 100 INJECTION, SOLUTION, CONCENTRATE INTRAVENOUS at 04:34

## 2023-09-02 RX ADMIN — POTASSIUM CHLORIDE 20 MEQ: 1500 TABLET, EXTENDED RELEASE ORAL at 04:34

## 2023-09-02 ASSESSMENT — PAIN DESCRIPTION - PAIN TYPE: TYPE: ACUTE PAIN

## 2023-09-02 NOTE — PROGRESS NOTES
Ines discharge instructions reviewed. All questions answered. Discussed setting up a PCP appt as soon as she is back home. Meds to beds provided.

## 2023-09-02 NOTE — PROGRESS NOTES
Patient seen and examined.  Nausea improved.  Headache controlled with tylenol.  Cleared for discharge by therapies.    - Discharge home today  - Rx sent to [facility] pharmacy

## 2023-09-02 NOTE — DISCHARGE INSTRUCTIONS
Discharge Instructions    Discharged to home by car with friend. Discharged via wheelchair, hospital escort: Yes.  Special equipment needed: Not Applicable    Be sure to schedule a follow-up appointment with your primary care doctor or any specialists as instructed.     Discharge Plan:        I understand that a diet low in cholesterol, fat, and sodium is recommended for good health. Unless I have been given specific instructions below for another diet, I accept this instruction as my diet prescription.   Other diet: regular    Special Instructions: None    -Is this patient being discharged with medication to prevent blood clots?  No    Is patient discharged on Warfarin / Coumadin?   No             - Call or seek medical attention for questions or concerns  - Follow up with the [facility] RETURN: PRN  - Avoid all blood thinners including aspirin or NSAIDs (ibuprophen, Advil, Aleve, Motrin) for at least two weeks  - Follow up with primary care provider within one weeks time  - Resume regular diet  - May take over the counter acetaminophen or as needed for pain  - Continue daily over the counter stool softener while on narcotics  - No operation of machinery or motorized vehicles while under the influence of narcotics  - No alcohol, marijuana or illicit drug use while under the influence of narcotics  - In the event of a narcotic overdose naloxone (Narcan) is available without a prescription from any [facility] or [facility]  - No swimming, hot tubs, baths or wound submersion until cleared by outpatient provider. May shower  - No contact sports, strenuous activities, or heavy lifting until cleared by outpatient provider  - If respiratory decompensation, persistent or worsening pain or signs or symptoms of infection occur seek medical attention      Subarachnoid Hemorrhage  Subarachnoid hemorrhage is bleeding around the brain. The bleeding puts pressure on the brain, and it stops blood from  going to some areas of the brain. If this bleeding is not treated, it may cause brain damage or death. This is an emergency. You must be treated in the hospital right away.  What are the causes?    Having a weak blood vessel in the brain that bursts.  Getting a head injury.  Bleeding from blood vessels that have developed abnormally.  Having a bleeding disorder.  Using blood-thinning medicines (anticoagulants).  Using certain drugs, such as cocaine.  In some cases, the cause is not known.  What increases the risk?  Smoking.  Having high blood pressure.  Drinking too much alcohol.  Having a family history of brain aneurysm.  Being older than 50 years old.  Being female, especially if you no longer have periods.  Having a certain syndrome.  Using cocaine.  What are the signs or symptoms?  A sudden, very bad headache. It may feel like the worst headache you have ever had.  Feeling like you may vomit (nausea) or vomiting, especially if you have other signs such as a headache.  Changes in your mental status such as:  Fainting.  Sudden confusion.  Trouble staying awake.  Stiff neck.  Changes in your vision such as:  Being sensitive to light.  Trouble seeing out of one eye or both eyes.  Sudden weakness or loss of feeling in your face, arm, or leg, especially on one side of the body.  Sudden trouble with any of these:  Walking or moving an arm or leg.  Talking.  Understanding what people say.  Swallowing.  Balance.  How is this treated?  You need to be treated in the hospital. Treatment may include:  Medicines that:  Reverse the effects of blood thinners, if you were taking blood thinners.  Lower your blood pressure.  Relieve pain.  Relieve vomiting or the feeling like you may vomit.  Prevent seizures.  Surgery to stop bleeding, repair the cause of the bleeding, or remove blood that has collected. This may include:  A procedure that is done from inside the blood vessel, in which the aneurysm is filled with small, platinum  "coils (endovascular coiling).  Opening the skull (craniotomy) to reach the aneurysm and put a clip at the base of the aneurysm (surgical clipping).  Surgery to relieve pressure on the brain by placing a tube in the brain to drain blood.  Physical, occupational, or speech-language therapy .  Other treatment depends on the cause and the symptoms, how long the symptoms have lasted, and how bad they are.  Follow these instructions at home:  Medicines  Take over-the-counter and prescription medicines only as told by your doctor.  Ask your doctor if the medicine prescribed to you requires you to avoid driving or using machinery.  Do not take any medicines that contain aspirin or NSAIDs, such as ibuprofen, unless your doctor says that it is safe to take them.  Lifestyle  Rest and limit activity as told by your doctor. Rest helps your brain to heal. Make sure you:  Get plenty of sleep.  Avoid activities that cause stress to your body or mind.  Do not use any products that have nicotine or tobacco. These include cigarettes, e-cigarettes, and chewing tobacco. If you need help quitting, ask your doctor.  General instructions  Do therapy as recommended. This may include:  Physical therapy.  Occupational therapy.  Speech-language therapy.  Ask your doctor if it is safe for you to eat and drink. You may need tests to make sure that you can swallow safely.  Check your blood pressure as told by your doctor. Write down your blood pressure.  Do not drive until your doctor says that it is safe to drive.  Keep all follow-up visits as told by your doctor. This is important.  Where to find more information  American Stroke Association: www.stroke.org  Contact a doctor if:  You have a stiff neck.  You have a cough.  You have a fever.  Get help right away if:  You have any signs of a stroke. \"BE FAST\" is an easy way to remember the main warning signs:  B - Balance. Signs are dizziness, sudden trouble walking, or loss of balance.  E - Eyes. " Signs are trouble seeing or a sudden change in how you see.  F - Face. Signs are sudden weakness or loss of feeling of the face, or the face or eyelid drooping on one side.  A - Arms. Signs are weakness or loss of feeling in an arm. This happens suddenly and usually on one side of the body.  S - Speech. Signs are sudden trouble speaking, slurred speech, or trouble understanding what people say.  T - Time. Time to call emergency services. Write down what time symptoms started.  You have other signs of a stroke, such as:  A sudden, very bad headache with no known cause.  Feeling like you may vomit.  Vomiting.  Seizure.  These symptoms may be an emergency. Do not wait to see if the symptoms will go away. Get medical help right away. Call your local emergency services (911 in the [place] Do not drive yourself to the hospital.  Summary  Subarachnoid hemorrhage is bleeding in the brain. It is an emergency. You must be treated in the hospital right away.  Follow instructions from your doctor about eating, resting, and taking medicines.  Do not take any medicines that contain aspirin or NSAIDs unless your doctor says that it is safe to take them.  This information is not intended to replace advice given to you by your health care provider. Make sure you discuss any questions you have with your health care provider.  Document Revised: 02/02/2021 Document Reviewed: 02/02/2021  Elsevier Patient Education © 2023 [employer]

## 2023-09-02 NOTE — DISCHARGE SUMMARY
Trauma Discharge Summary    DATE OF ADMISSION: 8/30/2023    DATE OF DISCHARGE: 9/2/2023    LENGTH OF STAY: 4 days    ATTENDING PHYSICIAN: Hernán Esparza M.D.    CONSULTING PHYSICIAN:   Trish Abdalla M.D.    DISCHARGE DIAGNOSIS:  Principal Problem:    Trauma  Active Problems:    Traumatic subarachnoid hemorrhage with loss of consciousness of 30 minutes or less (HCC)    Discharge planning issues    Hypokalemia    Acute alcohol intoxication (HCC)    Contraindication to deep vein thrombosis (DVT) prophylaxis    History of atrial fibrillation    Respiratory failure following trauma (HCC)  Resolved Problems:    Paresthesia      PROCEDURES:  1. N/A    HISTORY OF PRESENT ILLNESS: The patient is a 53 y.o. female who was reportedly injured in a fall from an art car [place] [employer] [place].  The patient was initially evaluated in outside facility where she was intubated for airway protection.  The patient was transferred to [facility] in [place], [place].    HOSPITAL COURSE: The patient was triaged as a trauma red transfer activation. The patient was transported to the trauma intensive care unit.  The patient was found to have a subarachnoid hemorrhage.  She did have a consultation from neurosurgery.  This injury was treated nonoperatively with posttraumatic pharmacologic seizure prophylaxis and a cognitive evaluation by speech and language pathology.  Repeat imaging of the patient's brain showed stable injury.  The patient's GCS remained stable throughout her stay at 15.  On the day of discharge patient was afebrile, vital signs stable, tolerating regular diet, pain controlled on current regimen and GCS 15.  The patient and her father desired discharge.  Discharge instructions, medications and follow-up were discussed with the patient and her father in detail.  The patient was then discharged home with her family in stable condition.    HOSPITAL PROBLEM LIST:  * Trauma- (present on  admission)  Assessment & Plan  Fall from a structure 8 ft high at [facility].  Positive loss of consciousness. Vomiting at the scene. Intubated for airway protection.  Trauma Red Activation.  Hernán Esparza MD. Trauma Surgery.    Hypokalemia- (present on admission)  Assessment & Plan  9/1 Oral replacement.  Trend laboratory studies.    Discharge planning issues- (present on admission)  Assessment & Plan  Date of admission: 8/30/2023.  8/31 Transfer orders from SICU.  Cleared for discharge: No.  Discharge delayed: No.  Discharge date: tbd.    Traumatic subarachnoid hemorrhage with loss of consciousness of 30 minutes or less (HCC)- (present on admission)  Assessment & Plan  Subtle slight hyperdensity in left parietal sulcus suggests very subtle subarachnoid hemorrhage.  Non-operative management.   Repeat interval CT imaging of the brain stable.  Post traumatic pharmacologic seizure prophylaxis for 1 week.  Speech Language Pathology cognitive evaluation.  Enma Abdalla MD. Neurosurgeon. [facility].    History of atrial fibrillation- (present on admission)  Assessment & Plan  Sinus rhythm on arrival to ED  No home medication.  Cardiac monitoring.    Contraindication to deep vein thrombosis (DVT) prophylaxis- (present on admission)  Assessment & Plan  VTE prophylaxis initially contraindicated secondary to elevated bleeding risk.  9/1 Trauma surveillance venous duplex ultrasonography ordered.   8/31 Start lovenox.     Acute alcohol intoxication (HCC)- (present on admission)  Assessment & Plan  Admission blood alcohol level of 233.  Alcohol withdrawal surveillance.    Respiratory failure following trauma (HCC)- (present on admission)  Assessment & Plan  Intubated at [facility] for airway protection.  Continue full mechanical ventilatory support.   Ventilator bundle and Trauma weaning protocol.  8/31 Extubated.    Paresthesia-resolved as of 9/1/2023  Assessment & Plan  Upper extremity  weakness and paresthesias with cervical spine tenderness.   MRI cervical spine normal.          DISPOSITION: Discharged home in stable condition with her family on 9/2/2023. The patient and family were counseled and questions were answered. Specifically, signs and symptoms of infection, respiratory decompensation and persistent or worsening pain were discussed and the patient agrees to seek medical attention if any of these develop.    DISCHARGE MEDICATIONS:  The patients controlled substance history was reviewed and a controlled substance use informed consent (if applicable) was provided by [facility] and the patient has been prescribed.     Medication List        START taking these medications        Instructions   acetaminophen 500 MG Tabs  Commonly known as: Tylenol   Take 2 Tablets by mouth every 6 hours as needed for Mild Pain or Moderate Pain.  Dose: 1,000 mg     levETIRAcetam 500 MG Tabs  Commonly known as: Keppra   Take 1 Tablet by mouth 2 times a day for 5 days.  Dose: 500 mg     LORazepam 0.5 MG Tabs  Commonly known as: Ativan   Take 1 Tablet by mouth one time for 1 dose. Indications: Feeling Anxious, For flying  Dose: 0.5 mg            CONTINUE taking these medications        Instructions   albuterol 108 (90 Base) MCG/ACT Aers inhalation aerosol   Inhale 1-2 Puffs every 6 hours as needed for Shortness of Breath.  Dose: 1-2 Puff              ACTIVITY:  As tolerated.  No strenuous activities or contact sports for 4 weeks.    WOUND CARE:  Not applicable    DIET:  No orders of the defined types were placed in this encounter.      FOLLOW UP:  PRIMARY CARE    Schedule an appointment as soon as possible for a visit      Enma Abdalla M.D.  [address] [facility] [address]  [phone]    Call  As needed      TIME SPENT ON DISCHARGE: 39 minutes      ____________________________________________  Kelley Lockwood P.A.-C.    DD: 9/2/2023 9:58 AM

## 2023-09-02 NOTE — CARE PLAN
The patient is Stable - Low risk of patient condition declining or worsening    Shift Goals  Clinical Goals: Hemodynamic stability, monitor neuro status  Patient Goals: Rest  Family Goals: ARNAUD    Progress made toward(s) clinical / shift goals:    Problem: Knowledge Deficit - Standard  Goal: Patient and family/care givers will demonstrate understanding of plan of care, disease process/condition, diagnostic tests and medications  Outcome: Progressing     Problem: Skin Integrity  Goal: Skin integrity is maintained or improved  Outcome: Progressing     Problem: Fall Risk  Goal: Patient will remain free from falls  Outcome: Progressing     Problem: Safety - Medical Restraint  Goal: Remains free of injury from restraints (Restraint for Interference with Medical Device)  Outcome: Progressing  Goal: Free from restraint(s) (Restraint for Interference with Medical Device)  Outcome: Progressing     Problem: Pain - Standard  Goal: Alleviation of pain or a reduction in pain to the patient’s comfort goal  Outcome: Progressing     Problem: Seizure Precautions  Goal: Implementation of seizure precautions  Outcome: Progressing     Problem: Medication  Goal: Risk for seizure medication side effects will decrease  Outcome: Progressing     Problem: Knowledge Deficit - Seizures  Goal: Knowledge of seizure treatment regimen will improve  Outcome: Progressing     Problem: Hemodynamics  Goal: Patient's hemodynamics, fluid balance and neurologic status will be stable or improve  Outcome: Progressing     Problem: Bowel Elimination  Goal: Establish and maintain regular bowel function  Outcome: Progressing     Problem: Gastrointestinal Irritability  Goal: Nausea and vomiting will be absent or improve  Outcome: Progressing       Patient is not progressing towards the following goals: